# Patient Record
Sex: MALE | Race: WHITE | URBAN - NONMETROPOLITAN AREA
[De-identification: names, ages, dates, MRNs, and addresses within clinical notes are randomized per-mention and may not be internally consistent; named-entity substitution may affect disease eponyms.]

---

## 2017-08-02 ENCOUNTER — HOSPITAL ENCOUNTER (OUTPATIENT)
Age: 32
Discharge: HOME | End: 2017-08-02
Payer: COMMERCIAL

## 2017-08-02 DIAGNOSIS — Z20.2 EXPOSURE TO STD: ICD-10-CM

## 2017-08-02 PROCEDURE — 86696 HERPES SIMPLEX TYPE 2 TEST: CPT

## 2017-08-02 PROCEDURE — 36415 COLL VENOUS BLD VENIPUNCTURE: CPT

## 2017-08-02 PROCEDURE — 86694 HERPES SIMPLEX NES ANTBDY: CPT

## 2017-08-03 LAB
HERPES SIMPLEX VIRUS 2 IGG: 0.1
HERPES TYPE 1/2 IGM COMBINED: 1.01

## 2017-08-11 ENCOUNTER — HOSPITAL ENCOUNTER (OUTPATIENT)
Age: 32
Discharge: HOME | End: 2017-08-11
Payer: COMMERCIAL

## 2017-08-11 DIAGNOSIS — Z11.3 SCREEN FOR SEXUALLY TRANSMITTED DISEASES: ICD-10-CM

## 2017-08-11 LAB
HEPATITIS C ANTIBODY: NONREACTIVE
HIV AG/AB: NONREACTIVE

## 2017-08-11 PROCEDURE — 87389 HIV-1 AG W/HIV-1&-2 AB AG IA: CPT

## 2017-08-11 PROCEDURE — 86803 HEPATITIS C AB TEST: CPT

## 2017-08-11 PROCEDURE — 36415 COLL VENOUS BLD VENIPUNCTURE: CPT

## 2018-01-19 ENCOUNTER — HOSPITAL ENCOUNTER (OUTPATIENT)
Age: 33
Discharge: HOME | End: 2018-01-19
Payer: COMMERCIAL

## 2018-01-19 DIAGNOSIS — Z00.00 WELLNESS EXAMINATION: ICD-10-CM

## 2018-01-19 DIAGNOSIS — Z20.2 EXPOSURE TO STD: ICD-10-CM

## 2018-01-19 LAB
ABSOLUTE EOS #: 0.1 K/UL (ref 0–0.4)
ABSOLUTE IMMATURE GRANULOCYTE: NORMAL K/UL (ref 0–0.3)
ABSOLUTE LYMPH #: 1.2 K/UL (ref 1–4.8)
ABSOLUTE MONO #: 0.4 K/UL (ref 0–1)
BASOPHILS # BLD: 1 % (ref 0–2)
BASOPHILS ABSOLUTE: 0 K/UL (ref 0–0.2)
DIFFERENTIAL TYPE: NORMAL
EOSINOPHILS RELATIVE PERCENT: 2 % (ref 0–8)
HCT VFR BLD CALC: 43.7 % (ref 41–53)
HEMOGLOBIN: 14.5 G/DL (ref 13.5–17)
IMMATURE GRANULOCYTES: NORMAL %
LYMPHOCYTES # BLD: 24 % (ref 24–44)
MCH RBC QN AUTO: 29.5 PG (ref 26–34)
MCHC RBC AUTO-ENTMCNC: 33.2 G/DL (ref 31–37)
MCV RBC AUTO: 88.9 FL (ref 80–100)
MONOCYTES # BLD: 7 % (ref 0–12)
NRBC AUTOMATED: NORMAL PER 100 WBC
PDW BLD-RTO: 13.6 % (ref 12.1–15.2)
PLATELET # BLD: 244 K/UL (ref 140–450)
PLATELET ESTIMATE: NORMAL
PMV BLD AUTO: 7.8 FL (ref 6–12)
RBC # BLD: 4.91 M/UL (ref 4.5–5.9)
RBC # BLD: NORMAL 10*6/UL
SEG NEUTROPHILS: 66 % (ref 36–66)
SEGMENTED NEUTROPHILS ABSOLUTE COUNT: 3.3 K/UL (ref 1.8–7.7)
WBC # BLD: 5 K/UL (ref 3.5–11)
WBC # BLD: NORMAL 10*3/UL

## 2018-01-19 PROCEDURE — 86696 HERPES SIMPLEX TYPE 2 TEST: CPT

## 2018-01-19 PROCEDURE — 85025 COMPLETE CBC W/AUTO DIFF WBC: CPT

## 2018-01-19 PROCEDURE — 86694 HERPES SIMPLEX NES ANTBDY: CPT

## 2018-01-19 PROCEDURE — 87389 HIV-1 AG W/HIV-1&-2 AB AG IA: CPT

## 2018-01-19 PROCEDURE — 86695 HERPES SIMPLEX TYPE 1 TEST: CPT

## 2018-01-19 PROCEDURE — 87491 CHLMYD TRACH DNA AMP PROBE: CPT

## 2018-01-19 PROCEDURE — 87591 N.GONORRHOEAE DNA AMP PROB: CPT

## 2018-01-19 PROCEDURE — 36415 COLL VENOUS BLD VENIPUNCTURE: CPT

## 2018-01-20 LAB — HIV AG/AB: NONREACTIVE

## 2018-01-22 LAB
C. TRACHOMATIS DNA ,URINE: NEGATIVE
HERPES SIMPLEX VIRUS 1 IGG: 0.25
HERPES SIMPLEX VIRUS 2 IGG: 0.09
HERPES TYPE 1/2 IGM COMBINED: 0.54
N. GONORRHOEAE DNA, URINE: NEGATIVE

## 2018-04-12 ENCOUNTER — OFFICE VISIT (OUTPATIENT)
Dept: FAMILY MEDICINE CLINIC | Age: 33
End: 2018-04-12
Payer: COMMERCIAL

## 2018-04-12 VITALS
BODY MASS INDEX: 26.03 KG/M2 | DIASTOLIC BLOOD PRESSURE: 72 MMHG | HEIGHT: 78 IN | WEIGHT: 225 LBS | SYSTOLIC BLOOD PRESSURE: 112 MMHG

## 2018-04-12 DIAGNOSIS — K64.5 THROMBOSED EXTERNAL HEMORRHOID: Primary | ICD-10-CM

## 2018-04-12 PROCEDURE — 99213 OFFICE O/P EST LOW 20 MIN: CPT | Performed by: FAMILY MEDICINE

## 2018-04-12 RX ORDER — HYDROCORTISONE ACETATE 25 MG/1
25 SUPPOSITORY RECTAL 2 TIMES DAILY
Qty: 6 SUPPOSITORY | Refills: 0 | Status: SHIPPED | OUTPATIENT
Start: 2018-04-12 | End: 2018-06-06 | Stop reason: ALTCHOICE

## 2019-01-22 ENCOUNTER — OFFICE VISIT (OUTPATIENT)
Dept: PRIMARY CARE CLINIC | Age: 34
End: 2019-01-22
Payer: COMMERCIAL

## 2019-01-22 VITALS
WEIGHT: 225.8 LBS | OXYGEN SATURATION: 97 % | DIASTOLIC BLOOD PRESSURE: 66 MMHG | HEIGHT: 78 IN | SYSTOLIC BLOOD PRESSURE: 120 MMHG | HEART RATE: 60 BPM | BODY MASS INDEX: 26.12 KG/M2

## 2019-01-22 DIAGNOSIS — Z00.00 ANNUAL PHYSICAL EXAM: Primary | ICD-10-CM

## 2019-01-22 DIAGNOSIS — Z13.1 ENCOUNTER FOR SCREENING EXAMINATION FOR IMPAIRED GLUCOSE REGULATION AND DIABETES MELLITUS: ICD-10-CM

## 2019-01-22 DIAGNOSIS — Z13.6 ENCOUNTER FOR LIPID SCREENING FOR CARDIOVASCULAR DISEASE: ICD-10-CM

## 2019-01-22 DIAGNOSIS — Z13.220 ENCOUNTER FOR LIPID SCREENING FOR CARDIOVASCULAR DISEASE: ICD-10-CM

## 2019-01-22 PROCEDURE — 99395 PREV VISIT EST AGE 18-39: CPT | Performed by: FAMILY MEDICINE

## 2019-01-22 ASSESSMENT — ENCOUNTER SYMPTOMS
VOMITING: 0
SORE THROAT: 0
COUGH: 0
RHINORRHEA: 0
EYE REDNESS: 0
ABDOMINAL PAIN: 0
NAUSEA: 0
DIARRHEA: 0
WHEEZING: 0
SHORTNESS OF BREATH: 0
EYE DISCHARGE: 0

## 2019-01-23 ENCOUNTER — HOSPITAL ENCOUNTER (OUTPATIENT)
Age: 34
Discharge: HOME | End: 2019-01-23
Payer: COMMERCIAL

## 2019-01-23 DIAGNOSIS — Z13.220 ENCOUNTER FOR LIPID SCREENING FOR CARDIOVASCULAR DISEASE: ICD-10-CM

## 2019-01-23 DIAGNOSIS — Z00.00 ANNUAL PHYSICAL EXAM: ICD-10-CM

## 2019-01-23 DIAGNOSIS — Z13.1 ENCOUNTER FOR SCREENING EXAMINATION FOR IMPAIRED GLUCOSE REGULATION AND DIABETES MELLITUS: ICD-10-CM

## 2019-01-23 DIAGNOSIS — Z13.6 ENCOUNTER FOR LIPID SCREENING FOR CARDIOVASCULAR DISEASE: ICD-10-CM

## 2019-01-23 LAB
CHOLESTEROL/HDL RATIO: 2.6
CHOLESTEROL: 122 MG/DL
GLUCOSE FASTING: 82 MG/DL (ref 70–99)
HDLC SERPL-MCNC: 47 MG/DL
HEPATITIS C ANTIBODY: NONREACTIVE
HIV AG/AB: NONREACTIVE
LDL CHOLESTEROL: 66 MG/DL (ref 0–130)
TRIGL SERPL-MCNC: 44 MG/DL
VLDLC SERPL CALC-MCNC: NORMAL MG/DL (ref 1–30)

## 2019-01-23 PROCEDURE — 86694 HERPES SIMPLEX NES ANTBDY: CPT

## 2019-01-23 PROCEDURE — 86695 HERPES SIMPLEX TYPE 1 TEST: CPT

## 2019-01-23 PROCEDURE — 36415 COLL VENOUS BLD VENIPUNCTURE: CPT

## 2019-01-23 PROCEDURE — 80061 LIPID PANEL: CPT

## 2019-01-23 PROCEDURE — 82947 ASSAY GLUCOSE BLOOD QUANT: CPT

## 2019-01-23 PROCEDURE — 86696 HERPES SIMPLEX TYPE 2 TEST: CPT

## 2019-01-23 PROCEDURE — 86803 HEPATITIS C AB TEST: CPT

## 2019-01-23 PROCEDURE — 87389 HIV-1 AG W/HIV-1&-2 AB AG IA: CPT

## 2019-01-24 LAB
HERPES SIMPLEX VIRUS 1 IGG: 0.26
HERPES SIMPLEX VIRUS 2 IGG: 0.21
HERPES TYPE 1/2 IGM COMBINED: 1.1

## 2019-04-09 ENCOUNTER — HOSPITAL ENCOUNTER (OUTPATIENT)
Dept: PHYSICAL THERAPY | Facility: CLINIC | Age: 34
Discharge: HOME | End: 2019-04-09

## 2019-04-09 PROCEDURE — 9900000073 HC MANUAL THERAPY PER 15 MIN (SELF-PAY)

## 2019-04-09 PROCEDURE — 9900000069 HC VASOPNEUMATIC DEVICE THERAPY (SELF-PAY)

## 2019-04-09 PROCEDURE — 9900000066 HC EVALUATION (SELF-PAY)

## 2019-04-09 NOTE — CONSULTS
[x] [facility]    [address] [place] [address]    Phone: [phone]    Fax:  [phone]     Physical Therapy Running Evaluation    Date:  2019  Patient: Adwoa Son   : 1985  MRN: [id number]  Physician: Dr. Lawrence Scripture: PT [employer]  Medical Diagnosis: achilles tendonitis Rehab Codes: L04.710, R26.89  Onset date: 3/19/19    Next Dr's appt.: none    Subjective:   CC: R achilles pain that only affects his run, but does not stop or limit his runs. HPI: pain waxes and wanes. Feels stiff. After a run, it is painful to go downstairs and also if he turns to the R.  Running ~40 miles, cycling 3-4 hours/wk, swimming 1x/wk. Increased radha from 155 to 165 to 170spm.      PMHx: [x] Unremarkable [] Diabetes [] HTN  [] Pacemaker   [] MI/Heart Problems [] Cancer [] Arthritis [] Other:              [] Refer to full medical chart  In EPIC     Tests: [] X-Ray: [] MRI:  [x] none:     Medications: [] Refer to full medical record [] None [] Other:  Allergies:      [] Refer to full medical record [] None [] Other:    Working:  [x] Normal Duty  [] Light Duty  [] Off D/T Condition  [] Retired    [] Not Employed    []  Disability  [] Other:           Return to work:     Job/ADL Description:  School:  Next goal race: [facility] full    Pain:  [x] Yes  [] No Location: R AT Pain Rating: (0-10 scale) 3-4/10 worst,   Pain altered Tx:  [] Yes  [x] No  Action:  Symptoms:  [] Improving [] Worsening [] Same  Better:  [] AM    [] PM    [] Sit    [x] Not running  []Stand    [] Walk    [] Stretching  [] Other:  Worse: [] AM    [] PM    [] Sit    []Stand    [] Walk    [] Stairs    [x] Run    [] Other:  Sleep: [x] OK    [] Disturbed    Objective:    ROM  ° A/P *wnl unless noted STRENGTH TESTS (+/-) Left Right Not Tested    Left Right Left Right Ant.  Drawer   []   Hip Flex     Post. Drawer   []   Ext stiff Stiff  4  Lachmans   []   ER     Valgus Stress []   IR     Varus Stress   []   ABD     Hernandezs   []   ADD     Pat-Fem Grind   []   Knee Flex     FADIRs   []   Ext     Hip Scouring   []   Ankle DF stiff stiff   SHANNAs   []   PF     Piriformis   []   INV     Viktorias   []   EVER     Toni     []   GTE stiff stiff      []       OBSERVATION No Deficit Deficit Not Tested Comments   Posture       Forward Head [] [] []    Rounded Shoulders [] [] []    Kyphosis [] [] []    Lordosis [] [] []    Scoliosis [] [] []    Iliac Crest [] [] []    PSIS [] [] []    ASIS [] [] []    Genu Valgus [] [] []    Genu Varus [] [] []    Genu Recurvatum [] [] []    Pronation [x] [] []    Supination [x] [] []    Leg Length Discrp [x] [] []    Slumped Sitting [] [] []    Palpation [] [x] [] jesse hip flexors, med and lat heads of gastroc, soleus, PF   Sensation [] [] []    Edema [] [x] [] Incr. On mid substance   Neurological [] [] []    Patellar Mobility [] [] []    Patellar Orientation [] [] []    Gait [] [] [] Analysis:deferred as he did notbring his running apparel              Comments:  Assessment: pt presents with increased jesse achilles mid substance pain, + palpation, + swelling. Contributing is weak hip ext and limited hip ext and GTE ROM   Problems:    [x] ? Pain:     [x] ? ROM:    [x] ? Strength:    [x] ? Function: Not able to run as he wishes   [x] Gait Deviations TBD  [] Other:      STG: (to be met in 5 treatments)  1. ? Pain: <3/10 pain so that he can continue to run  2. Full hip ext ROM  3. ? Strength: 4/5 with hip ext  4. ? Function: able to run 30 miles per week  5. Independent with Home Exercise Programs    LTG: (to be met in 10 treatments)  1. Able to run without limitation of pain. 2. No pain in either AT. 3. 5/5 with jesse hip ext  4.  No pain with palpation to AT                 Patient goals: none listed    Rehab Potential:  [x] Good  [] Fair  [] Poor   Suggested Professional Referral:  [x] No  [] Yes:  Barriers to Goal Achievement[de-identified]  [x] No  [] Yes:  Domestic Concerns:  [x] No  [] Yes:    Pt. Education:  [x] Plans/Goals, Risks/Benefits discussed  [x] Home exercise program    Method of Education: [x] Verbal  [] Demo  [] Written  Comprehension of Education:  [x] Verbalizes understanding. [] Demonstrates understanding. [] Needs Review. [] Demonstrates/verbalizes understanding of HEP/Ed previously given. Treatment Plan:  [x] Therapeutic Exercise    [] Therapeutic Activity  [x] Manual Therapy   [] Alter G treadmill  [x] Phys perf test     [x] Vasocompression/Game Ready   [x] Neuromuscular Re-education [] Massage     [x] Instruction in HEP     [] Aquatic Therapy                           Frequency:  1x/week for 10 visits    Todays Treatment:  Modalities:   Treatment Location  Left      Right                          Position   AT []          [x]  [x] Supine    [] Prone   [] Side lying  [] Sitting          Treatment Modality   3 Vasocompression    34° temp    Med pressure     15min   1,2 Other:  Man, ex       Precautions: standard  Exercises:  Exercise Reps/ Time Weight/ Level Issued for HEP  Comments   Prone        Hip ext (glut max)   x     Supine        Orbie Lean S 3x30\"  x     Frog legged bridges        Sidelying        Clams 90/30 deg        Mira hip abd        Gym        Eccentric calf raises 2x15  x x 6 second descent   1/2 kneeling lunge S 3x30\"  x     Heel taps        Step downs     Posterior and lateral   Other:  Manual:   1.  DI to hip flexor (proximal and distal),   2.   STM to calf in prone     Specific Instructions for next treatment: add STM to PF    Treatment Charges: Mins Units   [x] Evaluation       [x]  Low       []  Moderate       []  High  1   [] Phys perf test     []  Ther Exercise     [x]  Manual Therapy 20 1   []  Ther Activities     []  Aquatics     [x]  Vasocompression 15 1   []  NMR       TOTAL TREATMENT TIME: 60    Time in:  3434 Time Out:1625    Electronically signed by: Evelio Quinn, PT    As we have a standing verbal order from Dr. Alli Campo Lonny Perkins, once signed, this eval/POC will serve as the formal written order. Physician Signature:________________________________Date:__________________  By signing above or cosigning this note, I have reviewed this plan of care and certify a need for medically necessary rehabilitation services.      *PLEASE SIGN ABOVE AND FAX BACK ALL PAGES*

## 2019-04-17 ENCOUNTER — HOSPITAL ENCOUNTER (OUTPATIENT)
Dept: PHYSICAL THERAPY | Facility: CLINIC | Age: 34
Discharge: HOME | End: 2019-04-17

## 2019-04-17 PROCEDURE — 9900000069 HC VASOPNEUMATIC DEVICE THERAPY (SELF-PAY)

## 2019-04-17 PROCEDURE — 9900000073 HC MANUAL THERAPY PER 15 MIN (SELF-PAY)

## 2019-04-17 NOTE — FLOWSHEET NOTE
[x] Merged with [facility]    [address] [place] [address]    Phone: [phone]    Fax:  [phone]       Physical Therapy Daily Treatment Note    Date:  2019  Patient Name:  Eloisa Kim    :  1985  MRN: [id number]  Physician: Dr. Cuca Baig: PT [employer]  Medical Diagnosis: achilles tendonitis         Rehab Codes: K05.806, R26.89  Onset date: 3/19/19                                       Next Dr's appt.: none   Visit# / total visits: 2/10   Cancels/No Shows: 0/0    Subjective:    Pain:  [] Yes  [x] No Location:  Pain Rating: (0-10 scale) 0/10  Pain altered Tx:  [x] No  [] Yes  Action:  Comments: felt stiff and swollen, but the intensity of pain was much less    Objective:  Location  Left      Right                          Position   AT []          [x]  [x] Supine    [] Prone   [] Side lying  [] Sitting                                            Treatment Modality   3 Vasocompression    34° temp    Med pressure     15min   1,2 Other:  Man, ex         Precautions: standard  Exercises:  Exercise Reps/ Time Weight/ Level Issued for HEP   Comments   Prone             Hip ext (glut max)     x       Supine             Toni S 3x30\"   x       Alex calf S 3x30\"   x x     Sidelying             Clams 90/30 deg             Mira hip abd             Gym             Eccentric calf raises 2x15   x x 6 second descent   1/2 kneeling lunge S 3x30\"   x       Heel taps             Step downs         Posterior and lateral   Alex calf S 3x30\"  X X            Other:  Manual:   1.  DI to  Rhip flexor (proximal and distal),   2. STM to calf in prone  3. STM to PF  4.   Hawk  to calf    Specific Instructions for next treatment:    Treatment Charges: Mins Units   []  Phys perf test     []  Ther Exercise     [x]  Manual Therapy 35 2   []  Ther Activities     []  NMR     [x]  Vasocompression 15 1   []  Other     Total Treatment time 50 3       Assessment: [x] Progressing toward goals. He presents with exquisite tenderness throughout the calf and plantar fascia. [] No change. [] Other:    Goals:  STG: (to be met in 5 treatments)  1. ? Pain: <3/10 pain so that he can continue to run  2. Full hip ext ROM  3. ? Strength: 4/5 with hip ext  4. ? Function: able to run 30 miles per week  5. Independent with Home Exercise Programs     LTG: (to be met in 10 treatments)  1. Able to run without limitation of pain. 2. No pain in either AT. 3. 5/5 with jesse hip ext  4. No pain with palpation to AT                 Patient goals: none listed    Pt. Education:  [x] Yes  [] No  [x] Reviewed Prior HEP/Ed  Method of Education: [] Verbal  [] Demo  [x] Written  Comprehension of Education:  [] Verbalizes understanding. [x] Demonstrates understanding. [x] Needs review. [] Demonstrates/verbalizes HEP/Ed previously given. Plan: [x] Continue per plan of care.  1x/wk   [] Other:      Time In:1700            Time Out: 1800    Electronically signed by:  Josefina Galvan, PT

## 2019-05-03 ENCOUNTER — HOSPITAL ENCOUNTER (OUTPATIENT)
Dept: PHYSICAL THERAPY | Facility: CLINIC | Age: 34
Discharge: HOME | End: 2019-05-03

## 2019-05-03 PROCEDURE — 9900000069 HC VASOPNEUMATIC DEVICE THERAPY (SELF-PAY)

## 2019-05-03 PROCEDURE — 9900000073 HC MANUAL THERAPY PER 15 MIN (SELF-PAY)

## 2019-05-03 NOTE — FLOWSHEET NOTE
[x] [facility]    [address] [place] [address]    Phone: [phone]    Fax:  [phone]       Physical Therapy Daily Treatment Note    Date:  5/3/2019  Patient Name:  Karyna Downs    :  1985  MRN: [id number]  Physician: Dr. Tahir Perez: PT [employer]  Medical Diagnosis: achilles tendonitis         Rehab Codes: L54.582, R26.89  Onset date: 3/19/19                                       Next Dr's appt.: none   Visit# / total visits: 3/10   Cancels/No Shows: 0/0    Subjective:    Pain:  [] Yes  [x] No Location:  Pain Rating: (0-10 scale) 0/10  Pain altered Tx:  [x] No  [] Yes  Action:  Comments: L calf cramping which started at mile 11. No R heel/AT issues. Next am, brief sharp pain, but resolved. Wishes to have STM and scraping to R and L   Objective:  Location  Left      Right                          Position   AT []          [x]  [x] Supine    [] Prone   [] Side lying  [] Sitting                                            Treatment Modality   3 Vasocompression    34° temp    Med pressure     15min   1,2 Other:  Man, ex         Precautions: standard  Exercises:  Exercise Reps/ Time Weight/ Level Issued for HEP   Comments   Prone             Hip ext (glut max)     x       Supine             Toni S 3x30\"   x       Alex calf S 3x30\"   x      Sidelying             Clams 90/30 deg             Mira hip abd             Gym             Eccentric calf raises 2x15   x  6 second descent   1/2 kneeling lunge S 3x30\"   x       Heel taps             Step downs         Posterior and lateral   Alex calf S 3x30\"  X             Other:  Manual:   1. STM to calf in prone and sidelying   2.   Hawk  to calf    Specific Instructions for next treatment:    Treatment Charges: Mins Units   []  Phys perf test     []  Ther Exercise     [x]  Manual Therapy 35 2   []  Ther Activities     []  NMR     [x] Vasocompression 15 1   []  Other     Total Treatment time 50 3       Assessment: [x] Progressing toward goals. He presents with exquisite tenderness throughout the calves. No exercises as he did a marathon 5 days ago      [] No change. [] Other:    Goals:  STG: (to be met in 5 treatments)  1. ? Pain: <3/10 pain so that he can continue to run  2. Full hip ext ROM  3. ? Strength: 4/5 with hip ext  4. ? Function: able to run 30 miles per week  5. Independent with Home Exercise Programs     LTG: (to be met in 10 treatments)  1. Able to run without limitation of pain. 2. No pain in either AT. 3. 5/5 with jesse hip ext  4. No pain with palpation to AT                 Patient goals: none listed    Pt. Education:  [x] Yes  [] No  [x] Reviewed Prior HEP/Ed  Method of Education: [] Verbal  [] Demo  [x] Written  Comprehension of Education:  [] Verbalizes understanding. [x] Demonstrates understanding. [x] Needs review. [] Demonstrates/verbalizes HEP/Ed previously given. Plan: [] Continue per plan of care. [x] Other: pt opts to call if he needs any further visits.   At this point, he is not limited by the AT      Time In:1500Time Out: Jonathan Allred    Electronically signed by:  Josefina Galvan PT

## 2019-05-21 ENCOUNTER — HOSPITAL ENCOUNTER (OUTPATIENT)
Dept: PHYSICAL THERAPY | Facility: CLINIC | Age: 34
Discharge: HOME | End: 2019-05-21

## 2019-05-21 PROCEDURE — 9900000069 HC VASOPNEUMATIC DEVICE THERAPY (SELF-PAY)

## 2019-05-21 PROCEDURE — 9900000066 HC EVALUATION (SELF-PAY)

## 2019-05-21 NOTE — CONSULTS
Other:  Sleep: [] OK    [] Disturbed    Objective:     ROM  °A/P END FEEL STRENGTH TESTS (+/-) Left Right Not Tested    Left Right  Left Right Drop Arm   []   Sit Shld Flex      Sulcus Sign   []   Sit Shld Abd      Apprehension   []   Sit Shld IR      Yergasons yes  []   Shoulder Flex 90%   pain  Speeds yes  []   Ext wnl   5  Neer yes  []   ABD 90%   pain  Crawford  yes  []   ER @ 0 45 90 wnl   pain  Painful Arc yes  []   IR pain   5  Tinel   []       OBSERVATION No Deficit Deficit Not Tested Comments   Forward Head [] [] []    Rounded Shoulders [] [x] []    Kyphosis [] [x] []    Scap Height/Position [] [] []    Winging [] [x] [] Ant scap tilt   SH Rhythm [] [] []    INSPECTION/PALPATION       SC/AC Joint [] [] []    Supraspinatus [] [x] []    Biceps tendon/groove [] [x] []    Posterior shld [] [x] []    Subscapularis [] [] []        FUNCTION Normal Difficult Unable   Overhead reach [] [x] []   Underarm reach  [] [x] []   Groom/Dress [] [x] []   Bra/Shirt tuck [] [x] []   Lift/Carry [] [] []   Swimming [] [x] []     Comments:    Assessment: Pt presents with L shoulder pain. Clinical findings include +spasm on infraspinatus, pec minor, weak RTC and scap thoracic muscles  Problems:    [x] ? Pain:  [x] ? ROM:  [x] ? Strength:  [x] ? Function:  [] Other:    STG: (to be met in 3 treatments)  1. ? Pain: <3/10 in L shoulder  2. ? ROM: near full ROM in all directions  3. ? Strength: at least 4/5 in all directions today  4. ? Function: able to resume swimming  5. Independent with Home Exercise Programs    LTG: (to be met in 6 treatments)  1. No pain in L shoulder  2. 5/5 with L shoulder in all directions  3. Not limitations with swimming  4.  Minimal to no tenderness on L pec minor and infraspinatus                     Patient goals: get back to swimming    Rehab Potential:  [x] Good  [] Fair  [] Poor   Suggested Professional Referral:  [x] No  [] Yes:  Barriers to Goal Achievement:  [x] No  [] Yes:  Domestic Concerns:  [x] No  [] Yes:    Pt. Education:  [x] Plans/Goals, Risks/Benefits discussed  [x] Home exercise program  Method of Education: [] Verbal  [] Demo  [x] Written  Comprehension of Education:  [x] Verbalizes understanding. [] Demonstrates understanding. [] Needs Review. [] Demonstrates/verbalizes understanding of HEP/Ed previously given. Treatment Plan:  [x] Therapeutic Exercise  [] Modalities:  [] Dry Needling  [] Therapeutic Activity  [] Ultrasound  [] Electrical Stimulation  [] Gait Training   [] Massage       [] Lumbar/Cervical Traction  [] Neuromuscular Re-education [] Cold/hotpack [] Iontophoresis: 4 mg/mL  [x] Instruction in HEP             Dexamethasone Sodium  [x] Manual Therapy             Phosphate  mAmin  [] Aquatic Therapy                   [x] Vasocompression/    [] Other:            Game Ready   []  Medication allergies reviewed for use of    Dexamethasone Sodium Phosphate 4mg/ml     with iontophoresis treatments. Pt is not allergic.        Frequency: 1-2 x/week for 6 visits    Todays Treatment:  Modalities:   Precautions:  Exercises:  Exercise Reps/ Time Weight/ Level Comments   Prone       LT sets 10x10\"     MT sets 10x10\"     SL      Ext rot 2x15     Supine      Pec minor S 3x30\"     Gym      TB ER next                       Other:  Manual:   1.  DI to pec minor and infraspinatus  Specific Instructions for next treatment:      Evaluation Complexity:  History (Personal factors, comorbidities) [] 0 [] 1-2 [] 3+   Exam (limitations, restrictions) [] 1-2 [] 3 [] 4+   Clinical presentation (progression) [] Stable [] Evolving  [] Unstable   Decision Making [] Low [] Moderate [] High    [x] Low Complexity [] Moderate Complexity [] High Complexity       Treatment Charges: Mins Units   [x] Evaluation       [x]  Low       []  Moderate       []  High  1   []  Modalities     []  Ther Exercise     []  Manual Therapy     []  Ther Activities     []  Aquatics     [x]  Vasocompression 15 1   []  Other TOTAL TREATMENT TIME: 50    Time in:  1502   Time Out: 602 [address]    Electronically signed by: Shaka White PT        Physician Signature:________________________________Date:__________________  By signing above or cosigning this note, I have reviewed this plan of care and certify a need for medically necessary rehabilitation services.      *PLEASE SIGN ABOVE AND FAX BACK ALL PAGES*

## 2019-06-25 ENCOUNTER — TELEPHONE (OUTPATIENT)
Dept: PRIMARY CARE CLINIC | Age: 34
End: 2019-06-25

## 2019-06-25 ENCOUNTER — HOSPITAL ENCOUNTER (OUTPATIENT)
Age: 34
Discharge: HOME | End: 2019-06-27
Payer: COMMERCIAL

## 2019-06-25 ENCOUNTER — HOSPITAL ENCOUNTER (OUTPATIENT)
Dept: GENERAL RADIOLOGY | Age: 34
Discharge: HOME | End: 2019-06-27
Payer: COMMERCIAL

## 2019-06-25 ENCOUNTER — OFFICE VISIT (OUTPATIENT)
Dept: PRIMARY CARE CLINIC | Age: 34
End: 2019-06-25
Payer: COMMERCIAL

## 2019-06-25 VITALS
OXYGEN SATURATION: 98 % | BODY MASS INDEX: 25.82 KG/M2 | SYSTOLIC BLOOD PRESSURE: 116 MMHG | DIASTOLIC BLOOD PRESSURE: 70 MMHG | HEART RATE: 59 BPM | WEIGHT: 223.4 LBS

## 2019-06-25 DIAGNOSIS — S29.019A THORACIC MYOFASCIAL STRAIN, INITIAL ENCOUNTER: ICD-10-CM

## 2019-06-25 DIAGNOSIS — S29.019A THORACIC MYOFASCIAL STRAIN, INITIAL ENCOUNTER: Primary | ICD-10-CM

## 2019-06-25 PROCEDURE — 72072 X-RAY EXAM THORAC SPINE 3VWS: CPT

## 2019-06-25 PROCEDURE — 99213 OFFICE O/P EST LOW 20 MIN: CPT | Performed by: FAMILY MEDICINE

## 2019-06-25 RX ORDER — CYCLOBENZAPRINE HCL 10 MG
10 TABLET ORAL NIGHTLY PRN
Qty: 30 TABLET | Refills: 0 | Status: SHIPPED | OUTPATIENT
Start: 2019-06-25 | End: 2019-07-05

## 2019-06-25 ASSESSMENT — ENCOUNTER SYMPTOMS
SHORTNESS OF BREATH: 0
COUGH: 0
VOMITING: 0
SORE THROAT: 0
NAUSEA: 0
EYE REDNESS: 0
RHINORRHEA: 0
EYE DISCHARGE: 0
BACK PAIN: 1
DIARRHEA: 0
WHEEZING: 0
ABDOMINAL PAIN: 0

## 2019-06-25 ASSESSMENT — PATIENT HEALTH QUESTIONNAIRE - PHQ9
2. FEELING DOWN, DEPRESSED OR HOPELESS: 0
1. LITTLE INTEREST OR PLEASURE IN DOING THINGS: 0
SUM OF ALL RESPONSES TO PHQ QUESTIONS 1-9: 0
SUM OF ALL RESPONSES TO PHQ9 QUESTIONS 1 & 2: 0
SUM OF ALL RESPONSES TO PHQ QUESTIONS 1-9: 0

## 2019-06-25 NOTE — PROGRESS NOTES
[address] [facility]  [address] [address] [facility]  Dept: [phone]    Devonte Smalls is a 29 y.o. male who presents today for his medical conditions/complaintsas noted below. Chief Complaint   Patient presents with    Back Injury     skydiving       HPI:     HPI  Pt was skydiving Sunday, had sudden pain with chute opening. No fever or chills. Pain in lower thoracic region. Pain is more constant. Pain level 5 of 10. No radiation. LDL Cholesterol (mg/dL)   Date Value   01/23/2019 66       (goal LDL is <100)   No results found for: AST, ALT, BUN  BP Readings from Last 3 Encounters:   06/25/19 116/70   01/22/19 120/66   06/06/18 110/70          (goal 120/80)    No past medical history on file. No past surgical history on file. Family History   Problem Relation Age of Onset    Breast Cancer Mother     Diabetes Maternal Grandmother        Social History     Tobacco Use    Smoking status: Never Smoker    Smokeless tobacco: Never Used   Substance Use Topics    Alcohol use: No     Alcohol/week: 0.0 oz      Current Outpatient Medications   Medication Sig Dispense Refill    cyclobenzaprine (FLEXERIL) 10 MG tablet Take 1 tablet by mouth nightly as needed for Muscle spasms 30 tablet 0     No current facility-administered medications for this visit. No Known Allergies    Health Maintenance   Topic Date Due    Varicella Vaccine (1 of 2 - 13+ 2-dose series) 04/01/1998    DTaP/Tdap/Td vaccine (2 - Td) 03/01/2023    Flu vaccine  Completed    HIV screen  Completed    Pneumococcal 0-64 years Vaccine  Aged Out       Subjective:      Review of Systems   Constitutional: Negative for chills and fever. HENT: Negative for rhinorrhea and sore throat. Eyes: Negative for discharge and redness. Respiratory: Negative for cough, shortness of breath and wheezing. Cardiovascular: Negative for chest pain and palpitations.    Gastrointestinal: Negative for abdominal pain, diarrhea, nausea and vomiting. Genitourinary: Negative for dysuria and frequency. Musculoskeletal: Positive for back pain. Negative for arthralgias and myalgias. Neurological: Negative for dizziness, light-headedness and headaches. Psychiatric/Behavioral: Negative for sleep disturbance. Objective:     /70   Pulse 59   Wt 223 lb 6.4 oz (101.3 kg)   SpO2 98%   BMI 25.82 kg/m²   Physical Exam   Constitutional: He is oriented to person, place, and time. He appears well-developed and well-nourished. No distress. HENT:   Head: Normocephalic and atraumatic. Mouth/Throat: Oropharynx is clear and moist.   Eyes: Pupils are equal, round, and reactive to light. Conjunctivae are normal. Right eye exhibits no discharge. Left eye exhibits no discharge. No scleral icterus. Neck: No tracheal deviation present. No thyromegaly present. Cardiovascular: Normal rate, regular rhythm and normal heart sounds. No carotid bruits   Pulmonary/Chest: Effort normal and breath sounds normal. No respiratory distress. He has no wheezes. Musculoskeletal: He exhibits no edema. Mild pain to palpation in lower thoracic region. Lymphadenopathy:     He has no cervical adenopathy. Neurological: He is alert and oriented to person, place, and time. Skin: Skin is warm. No rash noted. Psychiatric: He has a normal mood and affect. His behavior is normal. Thought content normal.   Nursing note and vitals reviewed. Assessment:       Diagnosis Orders   1. Thoracic myofascial strain, initial encounter  XR THORACIC SPINE (3 VIEWS)        Plan:    check dorsal spine xray  Continue ibuprofen  Add flexeril at bedtime    Return if symptoms worsen or fail to improve.     Orders Placed This Encounter   Procedures    XR THORACIC SPINE (3 VIEWS)     Standing Status:   Future     Standing Expiration Date:   6/25/2020     Order Specific Question:   Reason for exam:     Answer:   lower thoracic pain

## 2019-09-19 ENCOUNTER — TELEPHONE (OUTPATIENT)
Dept: PRIMARY CARE CLINIC | Age: 34
End: 2019-09-19

## 2019-09-19 ENCOUNTER — HOSPITAL ENCOUNTER (OUTPATIENT)
Age: 34
Setting detail: SPECIMEN
Discharge: HOME | End: 2019-09-19
Payer: COMMERCIAL

## 2019-09-19 ENCOUNTER — OFFICE VISIT (OUTPATIENT)
Dept: PRIMARY CARE CLINIC | Age: 34
End: 2019-09-19
Payer: COMMERCIAL

## 2019-09-19 VITALS
OXYGEN SATURATION: 94 % | DIASTOLIC BLOOD PRESSURE: 76 MMHG | WEIGHT: 223 LBS | HEART RATE: 48 BPM | BODY MASS INDEX: 25.77 KG/M2 | SYSTOLIC BLOOD PRESSURE: 126 MMHG

## 2019-09-19 DIAGNOSIS — L30.9 DERMATITIS: Primary | ICD-10-CM

## 2019-09-19 DIAGNOSIS — L30.9 DERMATITIS: ICD-10-CM

## 2019-09-19 PROCEDURE — 99213 OFFICE O/P EST LOW 20 MIN: CPT | Performed by: FAMILY MEDICINE

## 2019-09-19 ASSESSMENT — ENCOUNTER SYMPTOMS
ABDOMINAL PAIN: 0
EYE REDNESS: 0
DIARRHEA: 0
SHORTNESS OF BREATH: 0
COUGH: 0
NAUSEA: 0
VOMITING: 0
RHINORRHEA: 0
WHEEZING: 0
SORE THROAT: 0
EYE DISCHARGE: 0

## 2019-09-19 NOTE — PROGRESS NOTES
Date:   9/19/2020     Orders Placed This Encounter   Medications    fluocinonide (LIDEX) 0.05 % cream     Sig: Apply topically 2 times daily. Dispense:  15 g     Refill:  0       Patient given educationalmaterials - see patient instructions. Discussed use, benefit, and side effectsof prescribed medications. All patient questions answered. Pt voiced understanding. Reviewed health maintenance. Instructed to continue current medications, diet andexercise. Patient agreed with treatment plan. Follow up as directed.      Electronicallysigned by Lori Parra MD on 9/19/2019 at 11:53 AM

## 2019-09-19 NOTE — TELEPHONE ENCOUNTER
Pt states that he thinks he has a possible herpes outbreak and wants to know if he can be squeezed into an appointment today with Dr Mary Lopez.

## 2019-09-23 LAB — VARICELLA-ZOSTER AB, IGM: 0.25 ISR

## 2019-09-24 LAB — HERPES TYPE 1/2 IGM COMBINED: 0.92

## 2019-10-14 ENCOUNTER — TELEPHONE (OUTPATIENT)
Dept: PRIMARY CARE CLINIC | Age: 34
End: 2019-10-14

## 2019-10-16 DIAGNOSIS — L30.9 DERMATITIS: Primary | ICD-10-CM

## 2020-02-19 ENCOUNTER — OFFICE VISIT (OUTPATIENT)
Dept: PRIMARY CARE CLINIC | Age: 35
End: 2020-02-19
Payer: COMMERCIAL

## 2020-02-19 VITALS
WEIGHT: 224.2 LBS | DIASTOLIC BLOOD PRESSURE: 74 MMHG | HEART RATE: 62 BPM | BODY MASS INDEX: 25.94 KG/M2 | HEIGHT: 78 IN | TEMPERATURE: 98.1 F | SYSTOLIC BLOOD PRESSURE: 116 MMHG | OXYGEN SATURATION: 97 %

## 2020-02-19 LAB
INFLUENZA A ANTIBODY: NEGATIVE
INFLUENZA B ANTIBODY: NEGATIVE

## 2020-02-19 PROCEDURE — 87804 INFLUENZA ASSAY W/OPTIC: CPT | Performed by: FAMILY MEDICINE

## 2020-02-19 PROCEDURE — 99213 OFFICE O/P EST LOW 20 MIN: CPT | Performed by: FAMILY MEDICINE

## 2020-02-19 RX ORDER — AZITHROMYCIN 250 MG/1
250 TABLET, FILM COATED ORAL SEE ADMIN INSTRUCTIONS
Qty: 6 TABLET | Refills: 0 | Status: SHIPPED | OUTPATIENT
Start: 2020-02-19 | End: 2020-02-24

## 2020-02-19 ASSESSMENT — ENCOUNTER SYMPTOMS
NAUSEA: 0
WHEEZING: 0
RHINORRHEA: 0
EYE DISCHARGE: 0
SHORTNESS OF BREATH: 0
ABDOMINAL PAIN: 0
VOMITING: 0
COUGH: 0
DIARRHEA: 0
EYE REDNESS: 0
SORE THROAT: 0

## 2020-02-19 ASSESSMENT — PATIENT HEALTH QUESTIONNAIRE - PHQ9
SUM OF ALL RESPONSES TO PHQ QUESTIONS 1-9: 0
1. LITTLE INTEREST OR PLEASURE IN DOING THINGS: 0
SUM OF ALL RESPONSES TO PHQ QUESTIONS 1-9: 0
SUM OF ALL RESPONSES TO PHQ9 QUESTIONS 1 & 2: 0
2. FEELING DOWN, DEPRESSED OR HOPELESS: 0

## 2020-02-19 NOTE — PROGRESS NOTES
[facility] PRIMARY CARE  [address] [facility] [address]  Dept: [phone]    Kunal Koenig is a 29 y.o. male who presents today for his medical conditions/complaintsas noted below. Chief Complaint   Patient presents with    Cough    Fever       HPI:     HPI  Pt with four day history of cough, and fever. Some rhinnorhea. Feels week. Running fever as well. LDL Cholesterol (mg/dL)   Date Value   01/23/2019 66       (goal LDL is <100)   No results found for: AST, ALT, BUN  BP Readings from Last 3 Encounters:   02/19/20 116/74   09/19/19 126/76   06/25/19 116/70          (goal 120/80)    No past medical history on file. No past surgical history on file. Family History   Problem Relation Age of Onset    Breast Cancer Mother     Diabetes Maternal Grandmother        Social History     Tobacco Use    Smoking status: Never Smoker    Smokeless tobacco: Never Used   Substance Use Topics    Alcohol use: No     Alcohol/week: 0.0 standard drinks      Current Outpatient Medications   Medication Sig Dispense Refill    azithromycin (ZITHROMAX) 250 MG tablet Take 1 tablet by mouth See Admin Instructions for 5 days 500mg on day 1 followed by 250mg on days 2 - 5 6 tablet 0    fluocinonide (LIDEX) 0.05 % cream Apply topically 2 times daily. 15 g 0     No current facility-administered medications for this visit. No Known Allergies    Health Maintenance   Topic Date Due    Varicella vaccine (1 of 2 - 2-dose childhood series) 04/01/1986    Flu vaccine (1) 09/01/2019    DTaP/Tdap/Td vaccine (2 - Td) 03/01/2023    Shingles Vaccine (1 of 2) 04/01/2035    HIV screen  Completed    Hepatitis A vaccine  Aged Out    Hepatitis B vaccine  Aged Out    Hib vaccine  Aged Out    Meningococcal (ACWY) vaccine  Aged Out    Pneumococcal 0-64 years Vaccine  Aged Out       Subjective:      Review of Systems   Constitutional: Negative for chills and fever.    HENT: Negative for rhinorrhea and sore throat. Eyes: Negative for discharge and redness. Respiratory: Negative for cough, shortness of breath and wheezing. Cardiovascular: Negative for chest pain and palpitations. Gastrointestinal: Negative for abdominal pain, diarrhea, nausea and vomiting. Genitourinary: Negative for dysuria and frequency. Musculoskeletal: Negative for arthralgias and myalgias. Neurological: Negative for dizziness, light-headedness and headaches. Psychiatric/Behavioral: Negative for sleep disturbance. Objective:     /74   Pulse 62   Temp 98.1 °F (36.7 °C)   Ht 6' 6\" (1.981 m)   Wt 224 lb 3.2 oz (101.7 kg)   SpO2 97%   BMI 25.91 kg/m²   Physical Exam  Vitals signs and nursing note reviewed. Constitutional:       General: He is not in acute distress. Appearance: He is well-developed. HENT:      Head: Normocephalic and atraumatic. Eyes:      General: No scleral icterus. Right eye: No discharge. Left eye: No discharge. Conjunctiva/sclera: Conjunctivae normal.      Pupils: Pupils are equal, round, and reactive to light. Neck:      Thyroid: No thyromegaly. Trachea: No tracheal deviation. Cardiovascular:      Rate and Rhythm: Normal rate and regular rhythm. Heart sounds: Normal heart sounds. Comments: No carotid bruits  Pulmonary:      Effort: Pulmonary effort is normal. No respiratory distress. Breath sounds: Normal breath sounds. No wheezing. Lymphadenopathy:      Cervical: No cervical adenopathy. Skin:     General: Skin is warm. Findings: No rash. Neurological:      Mental Status: He is alert and oriented to person, place, and time. Psychiatric:         Behavior: Behavior normal.         Thought Content: Thought content normal.         Assessment:       Diagnosis Orders   1. Viral illness          Plan:    Patient advised to bleed to be viral bronchitis. Use Robitussin-CF and Tylenol or Motrin.   If no improvement or condition worsens in 2 to 3 days should start Z-Lj. Patient given note to not fly well having any cough or upper respiratory symptoms    Return if symptoms worsen or fail to improve. No orders of the defined types were placed in this encounter. Orders Placed This Encounter   Medications    azithromycin (ZITHROMAX) 250 MG tablet     Sig: Take 1 tablet by mouth See Admin Instructions for 5 days 500mg on day 1 followed by 250mg on days 2 - 5     Dispense:  6 tablet     Refill:  0       Patient given educationalmaterials - see patient instructions. Discussed use, benefit, and side effectsof prescribed medications. All patient questions answered. Pt voiced understanding. Reviewed health maintenance. Instructed to continue current medications, diet andexercise. Patient agreed with treatment plan. Follow up as directed.      Electronicallysigned by Arnulfo Milton MD on 2/19/2020 at 2:03 PM

## 2020-08-27 ENCOUNTER — OFFICE VISIT (OUTPATIENT)
Dept: PRIMARY CARE CLINIC | Age: 35
End: 2020-08-27
Payer: COMMERCIAL

## 2020-08-27 VITALS
DIASTOLIC BLOOD PRESSURE: 70 MMHG | HEART RATE: 62 BPM | BODY MASS INDEX: 26.59 KG/M2 | HEIGHT: 78 IN | OXYGEN SATURATION: 96 % | SYSTOLIC BLOOD PRESSURE: 122 MMHG | WEIGHT: 229.8 LBS

## 2020-08-27 PROCEDURE — 99395 PREV VISIT EST AGE 18-39: CPT | Performed by: FAMILY MEDICINE

## 2020-08-27 ASSESSMENT — ENCOUNTER SYMPTOMS
EYE DISCHARGE: 0
SHORTNESS OF BREATH: 0
SORE THROAT: 0
BACK PAIN: 1
DIARRHEA: 0
VOMITING: 0
NAUSEA: 0
COUGH: 0
EYE REDNESS: 0
ABDOMINAL PAIN: 0
RHINORRHEA: 0
WHEEZING: 0

## 2020-08-27 ASSESSMENT — PATIENT HEALTH QUESTIONNAIRE - PHQ9
1. LITTLE INTEREST OR PLEASURE IN DOING THINGS: 1
SUM OF ALL RESPONSES TO PHQ9 QUESTIONS 1 & 2: 2
SUM OF ALL RESPONSES TO PHQ QUESTIONS 1-9: 2
2. FEELING DOWN, DEPRESSED OR HOPELESS: 1
SUM OF ALL RESPONSES TO PHQ QUESTIONS 1-9: 2

## 2020-08-27 NOTE — PROGRESS NOTES
[address] [address]  Dept: [phone]    Devante Adhikari is a 28 y.o. male who presents today for his medical conditions/complaintsas noted below. Chief Complaint   Patient presents with    Check-Up       HPI:     HPI  Pt here for check up. Pt with pain in the lower lumbar region for the past two months. No radiation. No medication. LDL Cholesterol (mg/dL)   Date Value   01/23/2019 66       (goal LDL is <100)   No results found for: AST, ALT, BUN  BP Readings from Last 3 Encounters:   08/27/20 122/70   02/19/20 116/74   09/19/19 126/76          (goal 120/80)    No past medical history on file. No past surgical history on file. Family History   Problem Relation Age of Onset    Breast Cancer Mother     Diabetes Maternal Grandmother        Social History     Tobacco Use    Smoking status: Never Smoker    Smokeless tobacco: Never Used   Substance Use Topics    Alcohol use: No     Alcohol/week: 0.0 standard drinks      Current Outpatient Medications   Medication Sig Dispense Refill    fluocinonide (LIDEX) 0.05 % cream Apply topically 2 times daily. (Patient not taking: Reported on 8/27/2020) 15 g 0     No current facility-administered medications for this visit. No Known Allergies    Health Maintenance   Topic Date Due    Varicella vaccine (1 of 2 - 2-dose childhood series) 04/01/1986    Flu vaccine (1) 09/01/2020    DTaP/Tdap/Td vaccine (2 - Td) 03/01/2023    HIV screen  Completed    Hepatitis A vaccine  Aged Out    Hepatitis B vaccine  Aged Out    Hib vaccine  Aged Out    Meningococcal (ACWY) vaccine  Aged Out    Pneumococcal 0-64 years Vaccine  Aged Out       Subjective:      Review of Systems   Constitutional: Negative for chills and fever. HENT: Negative for rhinorrhea and sore throat. Eyes: Negative for discharge and redness.    Respiratory: Negative for cough, shortness of breath and wheezing. Cardiovascular: Negative for chest pain and palpitations. Gastrointestinal: Negative for abdominal pain, diarrhea, nausea and vomiting. Genitourinary: Negative for dysuria and frequency. Musculoskeletal: Positive for back pain. Negative for arthralgias and myalgias. Neurological: Negative for dizziness, light-headedness and headaches. Psychiatric/Behavioral: Negative for sleep disturbance. Objective:     /70   Pulse 62   Ht 6' 6\" (1.981 m)   Wt 229 lb 12.8 oz (104.2 kg)   SpO2 96%   BMI 26.56 kg/m²   Physical Exam  Vitals signs and nursing note reviewed. Constitutional:       General: He is not in acute distress. Appearance: He is well-developed. He is not ill-appearing. HENT:      Head: Normocephalic and atraumatic. Right Ear: External ear normal.      Left Ear: External ear normal.   Eyes:      General: No scleral icterus. Right eye: No discharge. Left eye: No discharge. Conjunctiva/sclera: Conjunctivae normal.      Pupils: Pupils are equal, round, and reactive to light. Neck:      Thyroid: No thyromegaly. Trachea: No tracheal deviation. Cardiovascular:      Rate and Rhythm: Normal rate and regular rhythm. Heart sounds: Normal heart sounds. Pulmonary:      Effort: Pulmonary effort is normal. No respiratory distress. Breath sounds: Normal breath sounds. No wheezing. Lymphadenopathy:      Cervical: No cervical adenopathy. Skin:     General: Skin is warm. Findings: No rash. Neurological:      Mental Status: He is alert and oriented to person, place, and time. Psychiatric:         Mood and Affect: Mood normal.         Behavior: Behavior normal.         Thought Content: Thought content normal.         Assessment:       Diagnosis Orders   1. Annual physical exam     2. Encounter for lipid screening for cardiovascular disease  Lipid, Fasting   3.  Encounter for screening examination for impaired glucose regulation and diabetes mellitus  Glucose, Fasting   4. STD exposure  HIV-1 And HIV-2 Antibodies    Herpes Type II IgG    Herpes Type I IgG        Plan:    Low back exercises given. Patient states will try chiropractor. If no improvement, would order lumbar x-ray into physical therapy. Blood work ordered. Flu shot discussed. Return in about 1 year (around 8/27/2021). Orders Placed This Encounter   Procedures    Lipid, Fasting     Standing Status:   Future     Standing Expiration Date:   8/27/2021    Glucose, Fasting     Standing Status:   Future     Standing Expiration Date:   2/27/2021    HIV-1 And HIV-2 Antibodies     Standing Status:   Future     Standing Expiration Date:   8/27/2021    Herpes Type II IgG     Standing Status:   Future     Standing Expiration Date:   8/27/2021    Herpes Type I IgG     Standing Status:   Future     Standing Expiration Date:   8/27/2021     No orders of the defined types were placed in this encounter. Patient given educationalmaterials - see patient instructions. Discussed use, benefit, and side effectsof prescribed medications. All patient questions answered. Pt voiced understanding. Reviewed health maintenance. Instructed to continue current medications, diet andexercise. Patient agreed with treatment plan. Follow up as directed.      Electronicallysigned by Jayna Contreras MD on 8/27/2020 at 5:14 PM

## 2020-08-27 NOTE — PATIENT INSTRUCTIONS
Patient Education        Low Back Pain: Exercises  Introduction  Here are some examples of exercises for you to try. The exercises may be suggested for a condition or for rehabilitation. Start each exercise slowly. Ease off the exercises if you start to have pain. You will be told when to start these exercises and which ones will work best for you. How to do the exercises  Press-up   1. Lie on your stomach, supporting your body with your forearms. 2. Press your elbows down into the floor to raise your upper back. As you do this, relax your stomach muscles and allow your back to arch without using your back muscles. As your press up, do not let your hips or pelvis come off the floor. 3. Hold for 15 to 30 seconds, then relax. 4. Repeat 2 to 4 times. Alternate arm and leg (bird dog) exercise   Do this exercise slowly. Try to keep your body straight at all times, and do not let one hip drop lower than the other. 1. Start on the floor, on your hands and knees. 2. Tighten your belly muscles. 3. Raise one leg off the floor, and hold it straight out behind you. Be careful not to let your hip drop down, because that will twist your trunk. 4. Hold for about 6 seconds, then lower your leg and switch to the other leg. 5. Repeat 8 to 12 times on each leg. 6. Over time, work up to holding for 10 to 30 seconds each time. 7. If you feel stable and secure with your leg raised, try raising the opposite arm straight out in front of you at the same time. Knee-to-chest exercise   1. Lie on your back with your knees bent and your feet flat on the floor. 2. Bring one knee to your chest, keeping the other foot flat on the floor (or keeping the other leg straight, whichever feels better on your lower back). 3. Keep your lower back pressed to the floor. Hold for at least 15 to 30 seconds. 4. Relax, and lower the knee to the starting position. 5. Repeat with the other leg. Repeat 2 to 4 times with each leg.   6. To get more stretch, put your other leg flat on the floor while pulling your knee to your chest.    Curl-ups   1. Lie on the floor on your back with your knees bent at a 90-degree angle. Your feet should be flat on the floor, about 12 inches from your buttocks. 2. Cross your arms over your chest. If this bothers your neck, try putting your hands behind your neck (not your head), with your elbows spread apart. 3. Slowly tighten your belly muscles and raise your shoulder blades off the floor. 4. Keep your head in line with your body, and do not press your chin to your chest.  5. Hold this position for 1 or 2 seconds, then slowly lower yourself back down to the floor. 6. Repeat 8 to 12 times. Pelvic tilt exercise   1. Lie on your back with your knees bent. 2. \"Brace\" your stomach. This means to tighten your muscles by pulling in and imagining your belly button moving toward your spine. You should feel like your back is pressing to the floor and your hips and pelvis are rocking back. 3. Hold for about 6 seconds while you breathe smoothly. 4. Repeat 8 to 12 times. Heel dig bridging   1. Lie on your back with both knees bent and your ankles bent so that only your heels are digging into the floor. Your knees should be bent about 90 degrees. 2. Then push your heels into the floor, squeeze your buttocks, and lift your hips off the floor until your shoulders, hips, and knees are all in a straight line. 3. Hold for about 6 seconds as you continue to breathe normally, and then slowly lower your hips back down to the floor and rest for up to 10 seconds. 4. Do 8 to 12 repetitions. Hamstring stretch in doorway   1. Lie on your back in a doorway, with one leg through the open door. 2. Slide your leg up the wall to straighten your knee. You should feel a gentle stretch down the back of your leg. 3. Hold the stretch for at least 15 to 30 seconds. Do not arch your back, point your toes, or bend either knee.  Keep one heel touching the floor and the other heel touching the wall. 4. Repeat with your other leg. 5. Do 2 to 4 times for each leg. Hip flexor stretch   1. Kneel on the floor with one knee bent and one leg behind you. Place your forward knee over your foot. Keep your other knee touching the floor. 2. Slowly push your hips forward until you feel a stretch in the upper thigh of your rear leg. 3. Hold the stretch for at least 15 to 30 seconds. Repeat with your other leg. 4. Do 2 to 4 times on each side. Wall sit   1. Stand with your back 10 to 12 inches away from a wall. 2. Lean into the wall until your back is flat against it. 3. Slowly slide down until your knees are slightly bent, pressing your lower back into the wall. 4. Hold for about 6 seconds, then slide back up the wall. 5. Repeat 8 to 12 times. Follow-up care is a key part of your treatment and safety. Be sure to make and go to all appointments, and call your doctor if you are having problems. It's also a good idea to know your test results and keep a list of the medicines you take. Where can you learn more? Go to [email or web address]://[employer] [email or web address].Orgdot. org and sign in to your [employer] account. Enter D083 in the [facility] box to learn more about \"Low Back Pain: Exercises. \"     If you do not have an account, please click on the \"Sign Up Now\" link. Current as of: March 2, 2020               Content Version: 12.5  © 2006-2020 [employer] Inc[employer]. Care instructions adapted under license by [facility] ([facility]). If you have questions about a medical condition or this instruction, always ask your healthcare professional. [place] [address] any warranty or liability for your use of this information.

## 2020-09-01 ENCOUNTER — HOSPITAL ENCOUNTER (OUTPATIENT)
Age: 35
Setting detail: SPECIMEN
Discharge: HOME | End: 2020-09-01
Payer: COMMERCIAL

## 2020-09-01 LAB
CHOLESTEROL, FASTING: 100 MG/DL
CHOLESTEROL/HDL RATIO: 2.7
GLUCOSE FASTING: 80 MG/DL (ref 70–99)
HDLC SERPL-MCNC: 37 MG/DL
HIV AG/AB: NONREACTIVE
LDL CHOLESTEROL: 53 MG/DL (ref 0–130)
TRIGLYCERIDE, FASTING: 51 MG/DL
VLDLC SERPL CALC-MCNC: ABNORMAL MG/DL (ref 1–30)

## 2020-09-03 LAB
HERPES SIMPLEX VIRUS 1 IGG: 0.45
HERPES SIMPLEX VIRUS 2 IGG: 0.21

## 2021-01-04 ENCOUNTER — OFFICE VISIT (OUTPATIENT)
Dept: PRIMARY CARE CLINIC | Age: 36
End: 2021-01-04
Payer: COMMERCIAL

## 2021-01-04 VITALS
SYSTOLIC BLOOD PRESSURE: 116 MMHG | BODY MASS INDEX: 28.31 KG/M2 | WEIGHT: 245 LBS | DIASTOLIC BLOOD PRESSURE: 74 MMHG | OXYGEN SATURATION: 98 % | HEART RATE: 84 BPM

## 2021-01-04 DIAGNOSIS — M79.671 PAIN OF RIGHT HEEL: Primary | ICD-10-CM

## 2021-01-04 PROCEDURE — 99213 OFFICE O/P EST LOW 20 MIN: CPT | Performed by: PHYSICIAN ASSISTANT

## 2021-01-04 ASSESSMENT — ENCOUNTER SYMPTOMS
CHEST TIGHTNESS: 0
SORE THROAT: 0
VOMITING: 0
PHOTOPHOBIA: 0
RHINORRHEA: 0
ABDOMINAL DISTENTION: 0
CONSTIPATION: 0
SHORTNESS OF BREATH: 0
COUGH: 0
DIARRHEA: 0
EYE DISCHARGE: 0
ABDOMINAL PAIN: 0
SINUS PRESSURE: 0

## 2021-01-04 ASSESSMENT — PATIENT HEALTH QUESTIONNAIRE - PHQ9
1. LITTLE INTEREST OR PLEASURE IN DOING THINGS: 0
SUM OF ALL RESPONSES TO PHQ QUESTIONS 1-9: 0

## 2021-01-04 NOTE — PROGRESS NOTES
[facility] CARE  [address] [place] [id number]  Dept: [phone]    Dede Camarena is a 28 y.o. male who presents today for his medical conditions/complaintsas noted below. Chief Complaint   Patient presents with    Foot Pain     right foot pain- Pt states he landed wrong while brenda diving. some swelling       HPI:     HPI: The patient had an X ray on Friday in [facility] which did not show fracture. He is still having heel pain after a brenda diving accident. Hurts if he walks normally but not if he walks on toe. No pain with ankle movement. Patient only experiences pain with weightbearing on heel. LDL Cholesterol (mg/dL)   Date Value   09/01/2020 53   01/23/2019 66       (goal LDL is <100)   No results found for: AST, ALT, BUN  BP Readings from Last 3 Encounters:   01/04/21 116/74   08/27/20 122/70   02/19/20 116/74          (goal 120/80)    No past medical history on file. No past surgical history on file. Family History   Problem Relation Age of Onset    Breast Cancer Mother     Diabetes Maternal Grandmother        Social History     Tobacco Use    Smoking status: Never Smoker    Smokeless tobacco: Never Used   Substance Use Topics    Alcohol use: No     Alcohol/week: 0.0 standard drinks      Current Outpatient Medications   Medication Sig Dispense Refill    fluocinonide (LIDEX) 0.05 % cream Apply topically 2 times daily. (Patient not taking: Reported on 8/27/2020) 15 g 0     No current facility-administered medications for this visit.       No Known Allergies    Health Maintenance   Topic Date Due    Varicella vaccine (1 of 2 - 2-dose childhood series) 04/01/1986    Flu vaccine (1) 09/01/2020    DTaP/Tdap/Td vaccine (2 - Td) 03/01/2023    Hepatitis C screen  Completed    HIV screen  Completed    Hepatitis A vaccine  Aged Out    Hepatitis B vaccine  Aged Out    Hib vaccine  Aged Out    Meningococcal (ACWY) vaccine  Aged Out  Pneumococcal 0-64 years Vaccine  Aged Out       Subjective:      Review of Systems   Constitutional: Negative for chills, fever and unexpected weight change. HENT: Negative for congestion, hearing loss, rhinorrhea, sinus pressure and sore throat. Eyes: Negative for photophobia, discharge and visual disturbance. Respiratory: Negative for cough, chest tightness and shortness of breath. Cardiovascular: Negative for chest pain, palpitations and leg swelling. Gastrointestinal: Negative for abdominal distention, abdominal pain, constipation, diarrhea and vomiting. Endocrine: Negative for polydipsia and polyuria. Genitourinary: Negative for decreased urine volume, difficulty urinating, frequency and urgency. Musculoskeletal: Positive for arthralgias, gait problem and myalgias. Skin: Negative for rash. Allergic/Immunologic: Negative for food allergies. Neurological: Negative for dizziness, weakness, numbness and headaches. Hematological: Negative for adenopathy. Psychiatric/Behavioral: Negative for dysphoric mood and sleep disturbance. The patient is not nervous/anxious. Objective:     /74   Pulse 84   Wt 245 lb (111.1 kg)   SpO2 98%   BMI 28.31 kg/m²   Physical Exam  Constitutional:       General: He is not in acute distress. Appearance: Normal appearance. He is not ill-appearing. HENT:      Head: Normocephalic and atraumatic. Right Ear: External ear normal.      Left Ear: External ear normal.      Nose: Nose normal.      Mouth/Throat:      Mouth: Mucous membranes are moist.   Eyes:      Extraocular Movements: Extraocular movements intact. Conjunctiva/sclera: Conjunctivae normal.      Pupils: Pupils are equal, round, and reactive to light. Neck:      Musculoskeletal: Normal range of motion and neck supple. Vascular: No carotid bruit. Cardiovascular:      Rate and Rhythm: Normal rate and regular rhythm. Pulses: Normal pulses. The patient was educated and fit by a healthcare professional with expert knowledge and specialization in brace application while under the direct supervision of the physician. Verbal and written instructions for the use of and application of this item were provided. They were instructed to contact the office immediately should the brace result in increased pain, decreased sensation, increased swelling or worsening of the condition. No orders of the defined types were placed in this encounter. Patient given educationalmaterials - see patient instructions. Discussed use, benefit, and side effectsof prescribed medications. All patient questions answered. Pt voiced understanding. Reviewed health maintenance. Instructed to continue current medications, diet andexercise. Patient agreed with treatment plan. Follow up as directed.      Electronicallysigned by ORLANDO Phillips on 1/4/2021 at 12:06 PM

## 2021-01-07 ENCOUNTER — OFFICE VISIT (OUTPATIENT)
Dept: PRIMARY CARE CLINIC | Age: 36
End: 2021-01-07

## 2021-01-07 ENCOUNTER — HOSPITAL ENCOUNTER (OUTPATIENT)
Age: 36
Setting detail: SPECIMEN
Discharge: HOME | End: 2021-01-07
Payer: COMMERCIAL

## 2021-01-07 VITALS — TEMPERATURE: 96.8 F | OXYGEN SATURATION: 99 % | HEART RATE: 60 BPM

## 2021-01-07 DIAGNOSIS — B34.9 VIRAL ILLNESS: Primary | ICD-10-CM

## 2021-01-07 PROCEDURE — 99999 PR OFFICE/OUTPT VISIT,PROCEDURE ONLY: CPT | Performed by: FAMILY MEDICINE

## 2021-01-08 ENCOUNTER — TELEPHONE (OUTPATIENT)
Dept: PRIMARY CARE CLINIC | Age: 36
End: 2021-01-08

## 2021-01-08 DIAGNOSIS — M79.671 PAIN OF RIGHT HEEL: Primary | ICD-10-CM

## 2021-01-08 DIAGNOSIS — B34.9 VIRAL ILLNESS: ICD-10-CM

## 2021-01-08 NOTE — TELEPHONE ENCOUNTER
[facility] pre cert calling. Pt's ins will not cover a CT, but will an MRI w/out contrast of left foot. Asking if you would want to put that order in, or do a peer to peer? It mri okay, just order and she will ck epic later. Pt is scheduled for 10 am tomorrow. Thankyou!

## 2021-01-08 NOTE — TELEPHONE ENCOUNTER
No imaging is warranted for patient's ankle at this time. He does not meet guidelines for auto ankle rules. Patient most likely has sprain he should continue nonweightbearing supportive therapy and wearing the boot.

## 2021-01-08 NOTE — TELEPHONE ENCOUNTER
Order placed for right foot MRI.  Will you check with patient and make sure it is right foot because my note says that but your note says order left foot MRI

## 2021-01-10 LAB — SARS-COV-2, NAA: NOT DETECTED

## 2021-01-11 ENCOUNTER — TELEPHONE (OUTPATIENT)
Dept: PRIMARY CARE CLINIC | Age: 36
End: 2021-01-11

## 2021-01-13 ENCOUNTER — TELEPHONE (OUTPATIENT)
Dept: PRIMARY CARE CLINIC | Age: 36
End: 2021-01-13

## 2021-01-13 NOTE — TELEPHONE ENCOUNTER
will not do [id number] at this time. Patient told me that he called his insurance and they stated that MRI would be covered. However patient is still able to ambulate and there is only mild tenderness of the heel. X-ray of the heel was negative. This should improve with conservative treatment however if no improvement patient can come in to be reevaluated. I have tried to call patient however was unable to get through with the number provided.

## 2021-01-13 NOTE — TELEPHONE ENCOUNTER
[address] [place] [address] calling and states that the order for MRI FOOT RIGHT WO CONTRAST was denied and a peer to peer to required to have this approved. She states if you wish to do a peer to peer please call [phone] case number is [id number]  She also states that she will be contacting the pt to notify him that this appointment will be canceled until approved.

## 2021-01-18 ENCOUNTER — TELEPHONE (OUTPATIENT)
Dept: PRIMARY CARE CLINIC | Age: 36
End: 2021-01-18

## 2021-01-19 ENCOUNTER — HOSPITAL ENCOUNTER (OUTPATIENT)
Dept: GENERAL RADIOLOGY | Age: 36
Discharge: HOME | End: 2021-01-21
Payer: COMMERCIAL

## 2021-01-19 ENCOUNTER — HOSPITAL ENCOUNTER (OUTPATIENT)
Age: 36
Discharge: HOME | End: 2021-01-21
Payer: COMMERCIAL

## 2021-01-19 DIAGNOSIS — M79.671 HEEL PAIN, CHRONIC, RIGHT: Primary | ICD-10-CM

## 2021-01-19 DIAGNOSIS — M79.671 HEEL PAIN, CHRONIC, RIGHT: ICD-10-CM

## 2021-01-19 DIAGNOSIS — G89.29 HEEL PAIN, CHRONIC, RIGHT: ICD-10-CM

## 2021-01-19 DIAGNOSIS — G89.29 HEEL PAIN, CHRONIC, RIGHT: Primary | ICD-10-CM

## 2021-01-19 PROCEDURE — 73630 X-RAY EXAM OF FOOT: CPT

## 2021-01-22 ENCOUNTER — TELEPHONE (OUTPATIENT)
Dept: PRIMARY CARE CLINIC | Age: 36
End: 2021-01-22

## 2021-01-22 NOTE — TELEPHONE ENCOUNTER
Advised patient that he will need to do conservative therapy with crutches and walking boot for 6 weeks before CT will be covered. Continue nonweight bearing on right foot keep elevation ice ibuprofen as needed. Call office after 6 weeks has passed since skydiving incident and we will get the CT ordered.

## 2021-01-22 NOTE — TELEPHONE ENCOUNTER
Pt asking if he can get the CT done instead of the MRI because he can not afford the $1000 the MRI is going to cost him.

## 2021-02-19 ENCOUNTER — OFFICE VISIT (OUTPATIENT)
Dept: PRIMARY CARE CLINIC | Age: 36
End: 2021-02-19
Payer: COMMERCIAL

## 2021-02-19 VITALS
HEIGHT: 78 IN | OXYGEN SATURATION: 98 % | TEMPERATURE: 97.2 F | WEIGHT: 248 LBS | DIASTOLIC BLOOD PRESSURE: 74 MMHG | BODY MASS INDEX: 28.69 KG/M2 | HEART RATE: 81 BPM | SYSTOLIC BLOOD PRESSURE: 118 MMHG

## 2021-02-19 DIAGNOSIS — Z13.220 ENCOUNTER FOR LIPID SCREENING FOR CARDIOVASCULAR DISEASE: ICD-10-CM

## 2021-02-19 DIAGNOSIS — Z00.00 ANNUAL PHYSICAL EXAM: Primary | ICD-10-CM

## 2021-02-19 DIAGNOSIS — Z13.6 ENCOUNTER FOR LIPID SCREENING FOR CARDIOVASCULAR DISEASE: ICD-10-CM

## 2021-02-19 PROCEDURE — 99395 PREV VISIT EST AGE 18-39: CPT | Performed by: FAMILY MEDICINE

## 2021-02-19 PROCEDURE — G8484 FLU IMMUNIZE NO ADMIN: HCPCS | Performed by: FAMILY MEDICINE

## 2021-02-19 SDOH — ECONOMIC STABILITY: TRANSPORTATION INSECURITY
IN THE PAST 12 MONTHS, HAS THE LACK OF TRANSPORTATION KEPT YOU FROM MEDICAL APPOINTMENTS OR FROM GETTING MEDICATIONS?: NO

## 2021-02-19 SDOH — ECONOMIC STABILITY: FOOD INSECURITY: WITHIN THE PAST 12 MONTHS, THE FOOD YOU BOUGHT JUST DIDN'T LAST AND YOU DIDN'T HAVE MONEY TO GET MORE.: NEVER TRUE

## 2021-02-19 SDOH — ECONOMIC STABILITY: FOOD INSECURITY: WITHIN THE PAST 12 MONTHS, YOU WORRIED THAT YOUR FOOD WOULD RUN OUT BEFORE YOU GOT MONEY TO BUY MORE.: NEVER TRUE

## 2021-02-19 ASSESSMENT — ENCOUNTER SYMPTOMS
WHEEZING: 0
COUGH: 0
EYE DISCHARGE: 0
NAUSEA: 0
EYE REDNESS: 0
RHINORRHEA: 0
VOMITING: 0
ABDOMINAL PAIN: 0
SHORTNESS OF BREATH: 0
DIARRHEA: 0
SORE THROAT: 0

## 2021-02-19 NOTE — PROGRESS NOTES
 Meningococcal (ACWY) vaccine  Aged Out    Pneumococcal 0-64 years Vaccine  Aged Out       Subjective:      Review of Systems   Constitutional: Negative for chills and fever. HENT: Negative for rhinorrhea and sore throat. Eyes: Negative for discharge and redness. Respiratory: Negative for cough, shortness of breath and wheezing. Cardiovascular: Negative for chest pain and palpitations. Gastrointestinal: Negative for abdominal pain, diarrhea, nausea and vomiting. Genitourinary: Negative for discharge, dysuria, frequency and hematuria. Musculoskeletal: Negative for arthralgias and myalgias. Neurological: Negative for dizziness, light-headedness and headaches. Psychiatric/Behavioral: Negative for sleep disturbance. Objective:     /74   Pulse 81   Temp 97.2 °F (36.2 °C)   Ht 6' 6\" (1.981 m)   Wt 248 lb (112.5 kg)   SpO2 98%   BMI 28.66 kg/m²   Physical Exam  Vitals signs and nursing note reviewed. Constitutional:       General: He is not in acute distress. Appearance: He is well-developed. He is not ill-appearing. HENT:      Head: Normocephalic and atraumatic. Right Ear: External ear normal.      Left Ear: External ear normal.   Eyes:      General: No scleral icterus. Right eye: No discharge. Left eye: No discharge. Conjunctiva/sclera: Conjunctivae normal.      Pupils: Pupils are equal, round, and reactive to light. Neck:      Thyroid: No thyromegaly. Trachea: No tracheal deviation. Cardiovascular:      Rate and Rhythm: Normal rate and regular rhythm. Heart sounds: Normal heart sounds. Pulmonary:      Effort: Pulmonary effort is normal. No respiratory distress. Breath sounds: Normal breath sounds. No wheezing. Lymphadenopathy:      Cervical: No cervical adenopathy. Skin:     General: Skin is warm. Findings: No rash. Neurological:      Mental Status: He is alert and oriented to person, place, and time. Psychiatric:         Mood and Affect: Mood normal.         Behavior: Behavior normal.         Thought Content: Thought content normal.         Assessment:       Diagnosis Orders   1. Annual physical exam  Chlamydia/GC DNA, Urine    HIV-1 And HIV-2 Antibodies    T. Pallidum Ab    Basic Metabolic Panel, Fasting    Herpes Profile   2. Encounter for lipid screening for cardiovascular disease  Lipid, Fasting        Plan:    Blood and urine test ordered. Return in about 1 year (around 2/19/2022) for PHYSICAL EXAM.    Orders Placed This Encounter   Procedures    Chlamydia/GC DNA, Urine     Standing Status:   Future     Standing Expiration Date:   2/19/2022    HIV-1 And HIV-2 Antibodies     Standing Status:   Future     Standing Expiration Date:   2/19/2022    T. Pallidum Ab     Standing Status:   Future     Standing Expiration Date:   2/19/2022    Lipid, Fasting     Standing Status:   Future     Standing Expiration Date:   2/19/2022    Basic Metabolic Panel, Fasting     Standing Status:   Future     Standing Expiration Date:   2/19/2022    Herpes Profile     Standing Status:   Future     Standing Expiration Date:   2/19/2022     No orders of the defined types were placed in this encounter. Patient given educationalmaterials - see patient instructions. Discussed use, benefit, and side effectsof prescribed medications. All patient questions answered. Pt voiced understanding. Reviewed health maintenance. Instructed to continue current medications, diet andexercise. Patient agreed with treatment plan. Follow up as directed.      Electronicallysigned by Amaury Fuentes MD on 2/19/2021 at 2:48 PM

## 2021-02-22 ENCOUNTER — HOSPITAL ENCOUNTER (OUTPATIENT)
Age: 36
Setting detail: SPECIMEN
Discharge: HOME | End: 2021-02-22
Payer: COMMERCIAL

## 2021-02-22 DIAGNOSIS — Z13.220 ENCOUNTER FOR LIPID SCREENING FOR CARDIOVASCULAR DISEASE: ICD-10-CM

## 2021-02-22 DIAGNOSIS — Z00.00 ANNUAL PHYSICAL EXAM: ICD-10-CM

## 2021-02-22 DIAGNOSIS — Z13.6 ENCOUNTER FOR LIPID SCREENING FOR CARDIOVASCULAR DISEASE: ICD-10-CM

## 2021-02-22 LAB
ANION GAP SERPL CALCULATED.3IONS-SCNC: 9 MMOL/L (ref 9–17)
BUN BLDV-MCNC: 18 MG/DL (ref 6–20)
BUN/CREAT BLD: NORMAL (ref 9–20)
CALCIUM SERPL-MCNC: 9.3 MG/DL (ref 8.6–10.4)
CHLORIDE BLD-SCNC: 102 MMOL/L (ref 98–107)
CHOLESTEROL, FASTING: 135 MG/DL
CHOLESTEROL/HDL RATIO: 3
CO2: 29 MMOL/L (ref 20–31)
CREAT SERPL-MCNC: 0.86 MG/DL (ref 0.7–1.2)
GFR AFRICAN AMERICAN: >60 ML/MIN
GFR NON-AFRICAN AMERICAN: >60 ML/MIN
GFR SERPL CREATININE-BSD FRML MDRD: NORMAL ML/MIN/{1.73_M2}
GFR SERPL CREATININE-BSD FRML MDRD: NORMAL ML/MIN/{1.73_M2}
GLUCOSE FASTING: 86 MG/DL (ref 70–99)
HDLC SERPL-MCNC: 45 MG/DL
HIV AG/AB: NONREACTIVE
LDL CHOLESTEROL: 78 MG/DL (ref 0–130)
POTASSIUM SERPL-SCNC: 4.2 MMOL/L (ref 3.7–5.3)
SODIUM BLD-SCNC: 140 MMOL/L (ref 135–144)
T. PALLIDUM, IGG: NONREACTIVE
TRIGLYCERIDE, FASTING: 59 MG/DL
VLDLC SERPL CALC-MCNC: NORMAL MG/DL (ref 1–30)

## 2021-02-23 LAB
C. TRACHOMATIS DNA ,URINE: NEGATIVE
HERPES SIMPLEX VIRUS 1 IGG: 0.24
HERPES SIMPLEX VIRUS 2 IGG: 0.1
HERPES TYPE 1/2 IGM COMBINED: 0.74
N. GONORRHOEAE DNA, URINE: NEGATIVE
SPECIMEN DESCRIPTION: NORMAL

## 2021-04-08 ENCOUNTER — OFFICE VISIT (OUTPATIENT)
Dept: PRIMARY CARE CLINIC | Age: 36
End: 2021-04-08
Payer: COMMERCIAL

## 2021-04-08 ENCOUNTER — HOSPITAL ENCOUNTER (OUTPATIENT)
Age: 36
Setting detail: SPECIMEN
Discharge: HOME | End: 2021-04-08
Payer: COMMERCIAL

## 2021-04-08 VITALS
HEART RATE: 62 BPM | DIASTOLIC BLOOD PRESSURE: 62 MMHG | SYSTOLIC BLOOD PRESSURE: 108 MMHG | OXYGEN SATURATION: 98 % | TEMPERATURE: 98.4 F

## 2021-04-08 DIAGNOSIS — R50.9 FEVER, UNSPECIFIED FEVER CAUSE: ICD-10-CM

## 2021-04-08 DIAGNOSIS — R05.9 COUGH: ICD-10-CM

## 2021-04-08 DIAGNOSIS — R50.9 FEVER, UNSPECIFIED FEVER CAUSE: Primary | ICD-10-CM

## 2021-04-08 PROCEDURE — 1036F TOBACCO NON-USER: CPT | Performed by: FAMILY MEDICINE

## 2021-04-08 PROCEDURE — 99213 OFFICE O/P EST LOW 20 MIN: CPT | Performed by: FAMILY MEDICINE

## 2021-04-08 PROCEDURE — G8419 CALC BMI OUT NRM PARAM NOF/U: HCPCS | Performed by: FAMILY MEDICINE

## 2021-04-08 PROCEDURE — G8427 DOCREV CUR MEDS BY ELIG CLIN: HCPCS | Performed by: FAMILY MEDICINE

## 2021-04-08 ASSESSMENT — PATIENT HEALTH QUESTIONNAIRE - PHQ9
SUM OF ALL RESPONSES TO PHQ QUESTIONS 1-9: 0
2. FEELING DOWN, DEPRESSED OR HOPELESS: 0
1. LITTLE INTEREST OR PLEASURE IN DOING THINGS: 0

## 2021-04-08 NOTE — PATIENT INSTRUCTIONS
Patient Education        Cough: Care Instructions  Your Care Instructions     A cough is your body's response to something that bothers your throat or airways. Many things can cause a cough. You might cough because of a cold or the flu, bronchitis, or asthma. Smoking, postnasal drip, allergies, and stomach acid that backs up into your throat also can cause coughs. A cough is a symptom, not a disease. Most coughs stop when the cause, such as a cold, goes away. You can take a few steps at home to cough less and feel better. Follow-up care is a key part of your treatment and safety. Be sure to make and go to all appointments, and call your doctor if you are having problems. It's also a good idea to know your test results and keep a list of the medicines you take. How can you care for yourself at home? · Drink lots of water and other fluids. This helps thin the mucus and soothes a dry or sore throat. Honey or lemon juice in hot water or tea may ease a dry cough. · Take cough medicine as directed by your doctor. · Prop up your head on pillows to help you breathe and ease a dry cough. · Try cough drops to soothe a dry or sore throat. Cough drops don't stop a cough. Medicine-flavored cough drops are no better than candy-flavored drops or hard candy. · Do not smoke. Avoid secondhand smoke. If you need help quitting, talk to your doctor about stop-smoking programs and medicines. These can increase your chances of quitting for good. When should you call for help? Call 911 anytime you think you may need emergency care. For example, call if:    · You have severe trouble breathing. Call your doctor now or seek immediate medical care if:    · You cough up blood.     · You have new or worse trouble breathing.     · You have a new or higher fever.     · You have a new rash.    Watch closely for changes in your health, and be sure to contact your doctor if:    · You cough more deeply or more often, especially if you notice more mucus or a change in the color of your mucus.     · You have new symptoms, such as a sore throat, an earache, or sinus pain.     · You do not get better as expected. Where can you learn more? Go to https://[email or web address].StylePuzzle. org and sign in to your [employer] account. Enter D279 in the Mt[employer] box to learn more about \"Cough: Care Instructions. \"     If you do not have an account, please click on the \"Sign Up Now\" link. Current as of: October 26, 2020               Content Version: 12.8  © 5090-2669 [employer]wise, Incorporated. Care instructions adapted under license by [facility] ([facility]). If you have questions about a medical condition or this instruction, always ask your healthcare professional. [address] any warranty or liability for your use of this information.

## 2021-04-08 NOTE — PROGRESS NOTES
Mental Status: He is alert and oriented to person, place, and time. Psychiatric:         Mood and Affect: Mood normal.         Behavior: Behavior normal.         Thought Content: Thought content normal.         Assessment:       Diagnosis Orders   1. Fever, unspecified fever cause  COVID-19   2. Cough  COVID-19        Plan:      No follow-ups on file. Quarantine until test result is back. otc meds for sx. Call prn. Orders Placed This Encounter   Procedures    COVID-19     Standing Status:   Future     Number of Occurrences:   1     Standing Expiration Date:   4/8/2022     Scheduling Instructions:      1) Due to current limited availability of the COVID-19 test, tests will be prioritized based on responses to questions above. Testing may be delayed due to volume. 2) Print and instruct patient to adhere to CDC home isolation program. (Link Above)              3) Set up or refer patient for a monitoring program.              4) Have patient sign up for and leverage [employer] (if not previously done). Order Specific Question:   Is this test for diagnosis or screening? Answer:   Diagnosis of ill patient     Order Specific Question:   Symptomatic for COVID-19 as defined by CDC? Answer:   Yes     Order Specific Question:   Date of Symptom Onset     Answer:   4/5/2021     Order Specific Question:   Hospitalized for COVID-19? Answer:   No     Order Specific Question:   Admitted to ICU for COVID-19? Answer:   No     Order Specific Question:   Employed in healthcare setting? Answer:   No     Order Specific Question:   Resident in a congregate (group) care setting? Answer:   No     Order Specific Question:   Pregnant: Answer:   No     Order Specific Question:   Previously tested for COVID-19? Answer:   No     No orders of the defined types were placed in this encounter.             Electronically signed by Lesly Calle MD on 4/9/2021 at 9:14 AM

## 2021-04-09 LAB
SARS-COV-2: ABNORMAL
SARS-COV-2: DETECTED
SOURCE: ABNORMAL

## 2021-06-01 ENCOUNTER — OFFICE VISIT (OUTPATIENT)
Dept: PRIMARY CARE CLINIC | Age: 36
End: 2021-06-01
Payer: COMMERCIAL

## 2021-06-01 ENCOUNTER — NURSE TRIAGE (OUTPATIENT)
Dept: OTHER | Facility: CLINIC | Age: 36
End: 2021-06-01

## 2021-06-01 VITALS
BODY MASS INDEX: 28.35 KG/M2 | WEIGHT: 245 LBS | DIASTOLIC BLOOD PRESSURE: 78 MMHG | SYSTOLIC BLOOD PRESSURE: 122 MMHG | HEIGHT: 78 IN | OXYGEN SATURATION: 98 % | HEART RATE: 66 BPM

## 2021-06-01 DIAGNOSIS — M25.572 ACUTE LEFT ANKLE PAIN: Primary | ICD-10-CM

## 2021-06-01 PROCEDURE — G8419 CALC BMI OUT NRM PARAM NOF/U: HCPCS | Performed by: PHYSICIAN ASSISTANT

## 2021-06-01 PROCEDURE — G8427 DOCREV CUR MEDS BY ELIG CLIN: HCPCS | Performed by: PHYSICIAN ASSISTANT

## 2021-06-01 PROCEDURE — 1036F TOBACCO NON-USER: CPT | Performed by: PHYSICIAN ASSISTANT

## 2021-06-01 PROCEDURE — 99213 OFFICE O/P EST LOW 20 MIN: CPT | Performed by: PHYSICIAN ASSISTANT

## 2021-06-01 ASSESSMENT — ENCOUNTER SYMPTOMS
COUGH: 0
RHINORRHEA: 0
CHEST TIGHTNESS: 0
CONSTIPATION: 0
SHORTNESS OF BREATH: 0
SINUS PRESSURE: 0
VOMITING: 0
DIARRHEA: 0
ABDOMINAL DISTENTION: 0
ABDOMINAL PAIN: 0
EYE DISCHARGE: 0
PHOTOPHOBIA: 0
SORE THROAT: 0

## 2021-06-01 NOTE — PROGRESS NOTES
Negative for congestion, hearing loss, rhinorrhea, sinus pressure and sore throat. Eyes: Negative for photophobia, discharge and visual disturbance. Respiratory: Negative for cough, chest tightness and shortness of breath. Cardiovascular: Negative for chest pain, palpitations and leg swelling. Gastrointestinal: Negative for abdominal distention, abdominal pain, constipation, diarrhea and vomiting. Endocrine: Negative for polydipsia and polyuria. Genitourinary: Negative for decreased urine volume, difficulty urinating, frequency and urgency. Musculoskeletal: Positive for arthralgias and gait problem. Negative for myalgias. Skin: Negative for rash. Allergic/Immunologic: Negative for food allergies. Neurological: Negative for dizziness, weakness, numbness and headaches. Hematological: Negative for adenopathy. Psychiatric/Behavioral: Negative for dysphoric mood and sleep disturbance. The patient is not nervous/anxious. Objective:     /78   Pulse 66   Ht 6' 6\" (1.981 m)   Wt 245 lb (111.1 kg)   SpO2 98%   BMI 28.31 kg/m²   Physical Exam  Constitutional:       General: He is not in acute distress. Appearance: Normal appearance. He is not ill-appearing. HENT:      Head: Normocephalic and atraumatic. Right Ear: External ear normal.      Left Ear: External ear normal.      Nose: Nose normal.      Mouth/Throat:      Mouth: Mucous membranes are moist.   Eyes:      Extraocular Movements: Extraocular movements intact. Conjunctiva/sclera: Conjunctivae normal.      Pupils: Pupils are equal, round, and reactive to light. Neck:      Vascular: No carotid bruit. Cardiovascular:      Rate and Rhythm: Normal rate and regular rhythm. Pulses: Normal pulses. Heart sounds: Normal heart sounds. Pulmonary:      Effort: Pulmonary effort is normal. No respiratory distress. Breath sounds: Normal breath sounds.    Abdominal:      General: Bowel sounds are normal.
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
## 2021-06-04 ENCOUNTER — HOSPITAL ENCOUNTER (OUTPATIENT)
Dept: GENERAL RADIOLOGY | Age: 36
Discharge: HOME | End: 2021-06-06
Payer: COMMERCIAL

## 2021-06-04 ENCOUNTER — HOSPITAL ENCOUNTER (OUTPATIENT)
Age: 36
Discharge: HOME | End: 2021-06-06
Payer: COMMERCIAL

## 2021-06-04 DIAGNOSIS — M25.572 ACUTE LEFT ANKLE PAIN: ICD-10-CM

## 2021-06-04 PROCEDURE — 73610 X-RAY EXAM OF ANKLE: CPT

## 2021-12-02 ENCOUNTER — TELEPHONE (OUTPATIENT)
Dept: PRIMARY CARE CLINIC | Age: 36
End: 2021-12-02
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
## 2021-12-06 ENCOUNTER — OFFICE VISIT (OUTPATIENT)
Dept: PRIMARY CARE CLINIC | Age: 36
End: 2021-12-06
Payer: COMMERCIAL

## 2021-12-06 VITALS
OXYGEN SATURATION: 98 % | SYSTOLIC BLOOD PRESSURE: 104 MMHG | BODY MASS INDEX: 28.2 KG/M2 | DIASTOLIC BLOOD PRESSURE: 76 MMHG | HEART RATE: 68 BPM | WEIGHT: 244 LBS

## 2021-12-06 DIAGNOSIS — L02.91 ABSCESS: ICD-10-CM

## 2021-12-06 DIAGNOSIS — L72.0 EPIDERMOID CYST OF FINGER: Primary | ICD-10-CM

## 2021-12-06 PROCEDURE — G8419 CALC BMI OUT NRM PARAM NOF/U: HCPCS | Performed by: NURSE PRACTITIONER

## 2021-12-06 PROCEDURE — G8484 FLU IMMUNIZE NO ADMIN: HCPCS | Performed by: NURSE PRACTITIONER

## 2021-12-06 PROCEDURE — 99213 OFFICE O/P EST LOW 20 MIN: CPT | Performed by: NURSE PRACTITIONER

## 2021-12-06 PROCEDURE — G8427 DOCREV CUR MEDS BY ELIG CLIN: HCPCS | Performed by: NURSE PRACTITIONER

## 2021-12-06 PROCEDURE — 1036F TOBACCO NON-USER: CPT | Performed by: NURSE PRACTITIONER

## 2021-12-06 RX ORDER — SULFAMETHOXAZOLE AND TRIMETHOPRIM 800; 160 MG/1; MG/1
1 TABLET ORAL 2 TIMES DAILY
Qty: 20 TABLET | Refills: 0 | Status: SHIPPED | OUTPATIENT
Start: 2021-12-06 | End: 2021-12-16

## 2021-12-06 ASSESSMENT — ENCOUNTER SYMPTOMS
EYE REDNESS: 0
RHINORRHEA: 0
COUGH: 0
WHEEZING: 0
ABDOMINAL PAIN: 0
EYE DISCHARGE: 0
NAUSEA: 0
VOMITING: 0
SHORTNESS OF BREATH: 0
COLOR CHANGE: 1
DIARRHEA: 0
SORE THROAT: 0

## 2021-12-06 NOTE — PROGRESS NOTES
04/01/2025    Hepatitis C screen  Completed    HIV screen  Completed    Hepatitis A vaccine  Aged Out    Hepatitis B vaccine  Aged Out    Hib vaccine  Aged Out    Meningococcal (ACWY) vaccine  Aged Out    Pneumococcal 0-64 years Vaccine  Aged Out       Subjective:      Review of Systems   Constitutional: Negative for chills and fever. HENT: Negative for rhinorrhea and sore throat. Eyes: Negative for discharge and redness. Respiratory: Negative for cough, shortness of breath and wheezing. Cardiovascular: Negative for chest pain and palpitations. Gastrointestinal: Negative for abdominal pain, diarrhea, nausea and vomiting. Genitourinary: Negative for dysuria and frequency. Musculoskeletal: Negative for arthralgias and myalgias. Skin: Positive for color change and wound. Neurological: Negative for dizziness, light-headedness and headaches. Psychiatric/Behavioral: Negative for dysphoric mood and sleep disturbance. The patient is not nervous/anxious. Objective:     /76   Pulse 68   Wt 244 lb (110.7 kg)   SpO2 98%   BMI 28.20 kg/m²   Physical Exam  Vitals and nursing note reviewed. Constitutional:       General: He is not in acute distress. Appearance: He is well-developed. He is not ill-appearing. HENT:      Head: Normocephalic and atraumatic. Right Ear: External ear normal.      Left Ear: External ear normal.   Eyes:      General: No scleral icterus. Right eye: No discharge. Left eye: No discharge. Conjunctiva/sclera: Conjunctivae normal.      Pupils: Pupils are equal, round, and reactive to light. Neck:      Thyroid: No thyromegaly. Trachea: No tracheal deviation. Cardiovascular:      Rate and Rhythm: Normal rate and regular rhythm. Heart sounds: Normal heart sounds. Pulmonary:      Effort: Pulmonary effort is normal. No respiratory distress. Breath sounds: Normal breath sounds. No wheezing.    Lymphadenopathy: Cervical: No cervical adenopathy. Skin:     General: Skin is warm. Findings: Erythema present. No rash. Comments: Left index finger 1.5 X 1.2 X 0.7 raised abscess. Neurological:      Mental Status: He is alert and oriented to person, place, and time. Psychiatric:         Mood and Affect: Mood normal.         Behavior: Behavior normal.         Thought Content: Thought content normal.         Assessment/Plan:   1. Epidermoid cyst of finger  -     sulfamethoxazole-trimethoprim (BACTRIM DS) 800-160 MG per tablet; Take 1 tablet by mouth 2 times daily for 10 days, Disp-20 tablet, R-0Print  -     External Referral To Dermatology  2. Abscess  -     sulfamethoxazole-trimethoprim (BACTRIM DS) 800-160 MG per tablet; Take 1 tablet by mouth 2 times daily for 10 days, Disp-20 tablet, R-0Print     Attempted to drain abscess with 18 gauge needle and unable to drain. Complete antibiotic and follow up with dermatology. Return if symptoms worsen or fail to improve. Orders Placed This Encounter   Procedures    External Referral To Dermatology     Referral Priority:   Routine     Referral Type:   Eval and Treat     Referral Reason:   Specialty Services Required     Requested Specialty:   Dermatology     Number of Visits Requested:   1     Orders Placed This Encounter   Medications    sulfamethoxazole-trimethoprim (BACTRIM DS) 800-160 MG per tablet     Sig: Take 1 tablet by mouth 2 times daily for 10 days     Dispense:  20 tablet     Refill:  0       Patient given educational materials - see patient instructions. Discussed use, benefit, and side effects of prescribed medications. All patient questions answered. Pt voiced understanding. Reviewed health maintenance. Instructed to continue current medications, diet and exercise. Patient agreed with treatment plan. Follow up as directed.      Electronically signed by WAQAS Ramirez CNP on 12/6/2021 at 5:33 PM

## 2022-04-06 ENCOUNTER — OFFICE VISIT (OUTPATIENT)
Dept: PRIMARY CARE CLINIC | Age: 37
End: 2022-04-06
Payer: COMMERCIAL

## 2022-04-06 VITALS
SYSTOLIC BLOOD PRESSURE: 128 MMHG | HEIGHT: 78 IN | DIASTOLIC BLOOD PRESSURE: 74 MMHG | OXYGEN SATURATION: 98 % | BODY MASS INDEX: 27.56 KG/M2 | WEIGHT: 238.2 LBS | HEART RATE: 61 BPM

## 2022-04-06 DIAGNOSIS — L72.0 EPIDERMOID CYST OF FINGER: ICD-10-CM

## 2022-04-06 DIAGNOSIS — Z00.00 ENCOUNTER FOR WELL ADULT EXAM WITHOUT ABNORMAL FINDINGS: ICD-10-CM

## 2022-04-06 DIAGNOSIS — Z13.220 ENCOUNTER FOR LIPID SCREENING FOR CARDIOVASCULAR DISEASE: ICD-10-CM

## 2022-04-06 DIAGNOSIS — N52.9 ERECTILE DYSFUNCTION, UNSPECIFIED ERECTILE DYSFUNCTION TYPE: ICD-10-CM

## 2022-04-06 DIAGNOSIS — F98.8 ATTENTION DEFICIT DISORDER (ADD) WITHOUT HYPERACTIVITY: ICD-10-CM

## 2022-04-06 DIAGNOSIS — Z13.6 ENCOUNTER FOR LIPID SCREENING FOR CARDIOVASCULAR DISEASE: ICD-10-CM

## 2022-04-06 DIAGNOSIS — Z00.00 ANNUAL PHYSICAL EXAM: Primary | ICD-10-CM

## 2022-04-06 DIAGNOSIS — Z20.2 POSSIBLE EXPOSURE TO STD: ICD-10-CM

## 2022-04-06 PROCEDURE — 99395 PREV VISIT EST AGE 18-39: CPT | Performed by: FAMILY MEDICINE

## 2022-04-06 RX ORDER — DEXTROAMPHETAMINE SACCHARATE, AMPHETAMINE ASPARTATE, DEXTROAMPHETAMINE SULFATE AND AMPHETAMINE SULFATE 2.5; 2.5; 2.5; 2.5 MG/1; MG/1; MG/1; MG/1
10 TABLET ORAL DAILY
Qty: 30 TABLET | Refills: 0 | Status: SHIPPED | OUTPATIENT
Start: 2022-04-06 | End: 2022-05-06

## 2022-04-06 SDOH — ECONOMIC STABILITY: FOOD INSECURITY: WITHIN THE PAST 12 MONTHS, YOU WORRIED THAT YOUR FOOD WOULD RUN OUT BEFORE YOU GOT MONEY TO BUY MORE.: NEVER TRUE

## 2022-04-06 SDOH — ECONOMIC STABILITY: FOOD INSECURITY: WITHIN THE PAST 12 MONTHS, THE FOOD YOU BOUGHT JUST DIDN'T LAST AND YOU DIDN'T HAVE MONEY TO GET MORE.: NEVER TRUE

## 2022-04-06 ASSESSMENT — ENCOUNTER SYMPTOMS
ABDOMINAL PAIN: 0
EYE REDNESS: 0
EYE DISCHARGE: 0
SORE THROAT: 0
VOMITING: 0
NAUSEA: 0
RHINORRHEA: 0
WHEEZING: 0
DIARRHEA: 0
COUGH: 0
SHORTNESS OF BREATH: 0

## 2022-04-06 ASSESSMENT — SOCIAL DETERMINANTS OF HEALTH (SDOH): HOW HARD IS IT FOR YOU TO PAY FOR THE VERY BASICS LIKE FOOD, HOUSING, MEDICAL CARE, AND HEATING?: NOT HARD AT ALL
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
## 2022-04-07 DIAGNOSIS — Z00.00 ANNUAL PHYSICAL EXAM: ICD-10-CM

## 2022-04-07 DIAGNOSIS — Z20.2 POSSIBLE EXPOSURE TO STD: ICD-10-CM

## 2022-04-07 DIAGNOSIS — Z13.6 ENCOUNTER FOR LIPID SCREENING FOR CARDIOVASCULAR DISEASE: ICD-10-CM

## 2022-04-07 DIAGNOSIS — N52.9 ERECTILE DYSFUNCTION, UNSPECIFIED ERECTILE DYSFUNCTION TYPE: ICD-10-CM

## 2022-04-07 DIAGNOSIS — Z13.220 ENCOUNTER FOR LIPID SCREENING FOR CARDIOVASCULAR DISEASE: ICD-10-CM

## 2022-04-07 LAB
ALBUMIN SERPL-MCNC: 4.3 G/DL (ref 3.5–5.2)
ALBUMIN/GLOBULIN RATIO: 1.6 (ref 1–2.5)
ALP BLD-CCNC: 48 U/L (ref 40–129)
ALT SERPL-CCNC: 21 U/L (ref 5–41)
ANION GAP SERPL CALCULATED.3IONS-SCNC: 10 MMOL/L (ref 9–17)
AST SERPL-CCNC: 21 U/L
BILIRUB SERPL-MCNC: 0.52 MG/DL (ref 0.3–1.2)
BILIRUBIN DIRECT: 0.11 MG/DL
BILIRUBIN, INDIRECT: 0.41 MG/DL (ref 0–1)
BUN BLDV-MCNC: 17 MG/DL (ref 6–20)
BUN/CREAT BLD: NORMAL (ref 9–20)
CALCIUM SERPL-MCNC: 9 MG/DL (ref 8.6–10.4)
CHLORIDE BLD-SCNC: 107 MMOL/L (ref 98–107)
CHOLESTEROL, FASTING: 153 MG/DL
CHOLESTEROL/HDL RATIO: 3.6
CO2: 24 MMOL/L (ref 20–31)
CREAT SERPL-MCNC: 0.75 MG/DL (ref 0.7–1.2)
GFR AFRICAN AMERICAN: >60 ML/MIN
GFR NON-AFRICAN AMERICAN: >60 ML/MIN
GFR SERPL CREATININE-BSD FRML MDRD: NORMAL ML/MIN/{1.73_M2}
GFR SERPL CREATININE-BSD FRML MDRD: NORMAL ML/MIN/{1.73_M2}
GLOBULIN: NORMAL G/DL (ref 1.5–3.8)
GLUCOSE FASTING: 86 MG/DL (ref 70–99)
HDLC SERPL-MCNC: 42 MG/DL
HIV AG/AB: NONREACTIVE
LDL CHOLESTEROL: 94 MG/DL (ref 0–130)
POTASSIUM SERPL-SCNC: 4.2 MMOL/L (ref 3.7–5.3)
SODIUM BLD-SCNC: 141 MMOL/L (ref 135–144)
T. PALLIDUM, IGG: NONREACTIVE
TESTOSTERONE TOTAL: 1034 NG/DL (ref 220–1000)
TOTAL PROTEIN: 7 G/DL (ref 6.4–8.3)
TRIGLYCERIDE, FASTING: 87 MG/DL
VLDLC SERPL CALC-MCNC: NORMAL MG/DL (ref 1–30)

## 2022-04-08 LAB
C. TRACHOMATIS DNA ,URINE: NEGATIVE
N. GONORRHOEAE DNA, URINE: NEGATIVE

## 2022-04-12 LAB
HERPES SIMPLEX VIRUS 1 IGG: 0.43
HERPES SIMPLEX VIRUS 2 IGG: 0.1

## 2022-05-31 ENCOUNTER — TELEPHONE (OUTPATIENT)
Dept: PRIMARY CARE CLINIC | Age: 37
End: 2022-05-31

## 2022-05-31 NOTE — TELEPHONE ENCOUNTER
Pt got his BW done in our office in April. Was told that it was coded wrong and ins not going to cover it. Called the lab @ [phone] and will ask them to resubmit under annual physical Z00.00. Need to call back on Thurs, they are gone for the evening.

## 2022-06-06 NOTE — TELEPHONE ENCOUNTER
Faxed a letter to [address] [place] [address]: Evermaye Kb to [phone]. She will forward to billing. RE: the dos for labs on 4/7/22, with new DX.

## 2022-09-13 NOTE — TELEPHONE ENCOUNTER
Advised patient that I have done a peer to peer and they state that if another x-rays done and found to be negative since it has been 2 weeks they will be able to approve the MRI. Place an order for the x-ray to be done if negative I will call the insurance and get an MRI scheduled.
Pt notified, states he will get done at [facility]
Pt states MRI of foot needs to have a peer to peer to be considered for coverage with insurance.      [phone]   pt's RE-[phone]
Post-Care Instructions: I reviewed with the patient in detail post-care instructions. Patient is to wear sunprotection, and avoid picking at any of the treated lesions. Pt may apply Vaseline to crusted or scabbing areas.
Render Note In Bullet Format When Appropriate: No
Render Post-Care Instructions In Note?: yes
Consent: The patient's consent was obtained including but not limited to risks of crusting, scabbing, blistering, scarring, darker or lighter pigmentary change, recurrence, incomplete removal and infection.
Detail Level: Zone
Number Of Freeze-Thaw Cycles: 1 freeze-thaw cycle
Duration Of Freeze Thaw-Cycle (Seconds): 7